# Patient Record
Sex: MALE | Race: WHITE | NOT HISPANIC OR LATINO | Employment: OTHER | URBAN - METROPOLITAN AREA
[De-identification: names, ages, dates, MRNs, and addresses within clinical notes are randomized per-mention and may not be internally consistent; named-entity substitution may affect disease eponyms.]

---

## 2019-11-13 ENCOUNTER — OFFICE VISIT (OUTPATIENT)
Dept: FAMILY MEDICINE | Facility: CLINIC | Age: 79
End: 2019-11-13
Payer: MEDICARE

## 2019-11-13 VITALS
HEIGHT: 67 IN | TEMPERATURE: 98 F | BODY MASS INDEX: 26.64 KG/M2 | HEART RATE: 64 BPM | WEIGHT: 169.75 LBS | DIASTOLIC BLOOD PRESSURE: 66 MMHG | SYSTOLIC BLOOD PRESSURE: 106 MMHG | OXYGEN SATURATION: 91 %

## 2019-11-13 DIAGNOSIS — R97.20 ELEVATED PSA: ICD-10-CM

## 2019-11-13 DIAGNOSIS — I48.0 PAROXYSMAL ATRIAL FIBRILLATION: ICD-10-CM

## 2019-11-13 DIAGNOSIS — F51.01 PRIMARY INSOMNIA: ICD-10-CM

## 2019-11-13 DIAGNOSIS — I10 ESSENTIAL HYPERTENSION: Primary | ICD-10-CM

## 2019-11-13 DIAGNOSIS — J30.9 ALLERGIC RHINITIS, UNSPECIFIED SEASONALITY, UNSPECIFIED TRIGGER: ICD-10-CM

## 2019-11-13 DIAGNOSIS — E78.2 HYPERLIPIDEMIA, MIXED: ICD-10-CM

## 2019-11-13 PROCEDURE — 99999 PR PBB SHADOW E&M-NEW PATIENT-LVL III: CPT | Mod: PBBFAC,HCNC,, | Performed by: INTERNAL MEDICINE

## 2019-11-13 PROCEDURE — 99499 RISK ADDL DX/OHS AUDIT: ICD-10-PCS | Mod: S$GLB,,, | Performed by: INTERNAL MEDICINE

## 2019-11-13 PROCEDURE — 99214 PR OFFICE/OUTPT VISIT, EST, LEVL IV, 30-39 MIN: ICD-10-PCS | Mod: HCNC,S$GLB,, | Performed by: INTERNAL MEDICINE

## 2019-11-13 PROCEDURE — 1101F PR PT FALLS ASSESS DOC 0-1 FALLS W/OUT INJ PAST YR: ICD-10-PCS | Mod: HCNC,CPTII,S$GLB, | Performed by: INTERNAL MEDICINE

## 2019-11-13 PROCEDURE — 1101F PT FALLS ASSESS-DOCD LE1/YR: CPT | Mod: HCNC,CPTII,S$GLB, | Performed by: INTERNAL MEDICINE

## 2019-11-13 PROCEDURE — 99999 PR PBB SHADOW E&M-NEW PATIENT-LVL III: ICD-10-PCS | Mod: PBBFAC,HCNC,, | Performed by: INTERNAL MEDICINE

## 2019-11-13 PROCEDURE — 99499 UNLISTED E&M SERVICE: CPT | Mod: S$GLB,,, | Performed by: INTERNAL MEDICINE

## 2019-11-13 PROCEDURE — 99214 OFFICE O/P EST MOD 30 MIN: CPT | Mod: HCNC,S$GLB,, | Performed by: INTERNAL MEDICINE

## 2019-11-13 RX ORDER — CETIRIZINE HYDROCHLORIDE 10 MG/1
10 TABLET ORAL
COMMUNITY
Start: 2015-05-04

## 2019-11-13 RX ORDER — ASPIRIN 81 MG/1
81 TABLET ORAL
COMMUNITY
Start: 2015-05-04

## 2019-11-13 RX ORDER — ZOLPIDEM TARTRATE 10 MG/1
TABLET ORAL
COMMUNITY
Start: 2019-10-16 | End: 2020-01-09 | Stop reason: SDUPTHER

## 2019-11-13 RX ORDER — DILTIAZEM HYDROCHLORIDE 120 MG/1
CAPSULE, COATED, EXTENDED RELEASE ORAL
COMMUNITY
Start: 2019-10-16 | End: 2020-03-17 | Stop reason: SDUPTHER

## 2019-11-13 RX ORDER — CALCIUM CARB/VITAMIN D3/VIT K1 500-500-40
400 TABLET,CHEWABLE ORAL
COMMUNITY
Start: 2015-05-04

## 2019-11-13 RX ORDER — ATORVASTATIN CALCIUM 20 MG/1
TABLET, FILM COATED ORAL
COMMUNITY
Start: 2019-08-23 | End: 2019-12-05 | Stop reason: SDUPTHER

## 2019-11-13 NOTE — PROGRESS NOTES
[facility]    Chief Complaint      Chief Complaint   Patient presents with    Establish Care       History of Present Illness      Rob Mckeon is a 79 y.o. male with chronic conditions of A fib, HTN, HLD, insomnia,  allergic rhinitis, elevated PSA, vit d deficiency who presents today for: re-establish care from  and review chronic conditions.  A fib: Sees Dr. Pickard.  Appt next month.  On ASA for anticoagulation. Diltiazem for rate control.  Denies chest pain, shortness ofbreath, palpitations.  HTN: BP at goal on diltiazem.  HLD: Controlled on lipitor.  LDL will be done next week at Dr. Serrato.  Insomnia: Contorlled on ambien.  NO new complaints.  Elevated PSA: Due for repeat PSA.  No new or worsening obstructive symptoms.    Allergic rhinitis: Contorlled with mucinex as needed and zyrtec daily.   Flu shot UTD.  Prevnar, pneumovax UTD.    Cscope Dr. Toscano, few years ago, will get record. Hx of polyp, 5 yr interval.    Past Medical History:  Past Medical History:   Diagnosis Date    Pericarditis        Past Surgical History:   has a past surgical history that includes Cholecystectomy and Hernia repair.    Family History:  family history includes No Known Problems in his father and mother.     Social History:  Social History     Tobacco Use    Smoking status: Former Smoker   Substance Use Topics    Alcohol use: Not Currently    Drug use: Not on file       Review of Systems   Constitutional: Negative for chills, fever and malaise/fatigue.   Respiratory: Negative for shortness of breath.    Cardiovascular: Negative for chest pain.   Gastrointestinal: Negative for constipation, diarrhea, nausea and vomiting.   Skin: Negative for rash.   Neurological: Negative for weakness.        Medications:  Outpatient Encounter Medications as of 11/13/2019   Medication Sig Dispense Refill    aspirin (ECOTRIN) 81 MG EC tablet Take 81 mg by mouth.      atorvastatin (LIPITOR) 20 MG tablet        "cetirizine (ZYRTEC) 10 MG tablet Take 10 mg by mouth.      cholecalciferol, vitamin D3, 400 unit Cap Take 400 Int'l Units by mouth.      diltiaZEM (CARDIZEM CD) 120 MG Cp24       zolpidem (AMBIEN) 10 mg Tab        No facility-administered encounter medications on file as of 11/13/2019.        Allergies:  Review of patient's allergies indicates:  No Known Allergies    Health Maintenance:    There is no immunization history on file for this patient.   Health Maintenance   Topic Date Due    Lipid Panel  1940    TETANUS VACCINE  02/15/1956    Pneumococcal Vaccine (65+ Low/Medium Risk) (1 of 2 - PCV13) 02/15/2003        Physical Exam      Vital Signs  Temp: 97.5 °F (36.4 °C)  Temp src: Oral  Pulse: 64  SpO2: (!) 91 %  BP: 106/66  BP Location: Right arm  Patient Position: Sitting  Height and Weight  Height: 5' 7" (170.2 cm)  Weight: 77 kg (169 lb 12.1 oz)  BSA (Calculated - sq m): 1.91 sq meters  BMI (Calculated): 26.6  Weight in (lb) to have BMI = 25: 159.3]    Physical Exam   Constitutional: He appears well-developed and well-nourished.   HENT:   Head: Normocephalic and atraumatic.   Right Ear: External ear normal.   Left Ear: External ear normal.   Mouth/Throat: Oropharynx is clear and moist.   Eyes: Pupils are equal, round, and reactive to light. Conjunctivae and EOM are normal.   Cardiovascular: Normal rate, regular rhythm, normal heart sounds and intact distal pulses.   No murmur heard.  Pulmonary/Chest: Effort normal and breath sounds normal. He has no wheezes. He has no rales.   Abdominal: Soft. Bowel sounds are normal. He exhibits no distension and no abdominal bruit. There is no hepatosplenomegaly. There is no tenderness.   Vitals reviewed.       Laboratory:  CBC:      CMP:        Invalid input(s): CREATININ  URINALYSIS:       LIPIDS:      TSH:      A1C:        Assessment/Plan     Rob Mckeon is a 79 y.o.male with:    1. Essential hypertension  Continue current meds.    2. Hyperlipidemia, " mixed  Continue current meds.  Updating labs with Dr. Pickard.  3. Primary insomnia  Continue current meds.    4. Allergic rhinitis, unspecified seasonality, unspecified trigger  Continue current meds.    5. Elevated PSA  Updating labs.  6. Paroxysmal atrial fibrillation   Continue current meds.  F/U with Dr. Pickard.    Chronic conditions status updated as per HPI.  Other than changes above, cont current medications and maintain follow up with specialists.  Return to clinic in 6 months.    Rc Cabrera MD  [facility]

## 2019-11-21 ENCOUNTER — PATIENT OUTREACH (OUTPATIENT)
Dept: ADMINISTRATIVE | Facility: HOSPITAL | Age: 79
End: 2019-11-21

## 2019-12-05 RX ORDER — ATORVASTATIN CALCIUM 20 MG/1
20 TABLET, FILM COATED ORAL DAILY
Qty: 90 TABLET | Refills: 3 | Status: SHIPPED | OUTPATIENT
Start: 2019-12-05 | End: 2020-09-17

## 2019-12-05 NOTE — TELEPHONE ENCOUNTER
----- Message from Latia Stockton sent at 12/5/2019 11:28 AM CST -----  Contact: self / [phone]  Patient is requesting a refill on the below. Please advise    atorvastatin (LIPITOR) 20 MG tablet    Pharmacy     [facility] [place] [place] [facility] [address] [facility]

## 2019-12-09 LAB
CHOL/HDLC RATIO: 2
CHOLEST SERPL-MSCNC: 100 MG/DL (ref 0–200)
HDLC SERPL-MCNC: 42 MG/DL
LDLC SERPL CALC-MCNC: 41 MG/DL
TRIGLYCERIDE (LIPID PAN): 105

## 2019-12-17 ENCOUNTER — TELEPHONE (OUTPATIENT)
Dept: FAMILY MEDICINE | Facility: CLINIC | Age: 79
End: 2019-12-17

## 2019-12-17 NOTE — TELEPHONE ENCOUNTER
----- Message from Suzanna Soto sent at 12/17/2019  1:01 PM CST -----  Pt can be reached at [phone]    Pt called to get his prostrate test results.    Thank you!

## 2019-12-18 ENCOUNTER — TELEPHONE (OUTPATIENT)
Dept: FAMILY MEDICINE | Facility: CLINIC | Age: 79
End: 2019-12-18

## 2019-12-18 NOTE — TELEPHONE ENCOUNTER
----- Message from Tara Hector sent at 12/18/2019  9:18 AM CST -----  Contact: patient  Type:  Test Results    Who Called: pt  Name of Test (Lab/Mammo/Etc): labs  Date of Test: last week  Ordering Provider: Dr. Cabrera  Where the test was performed: [facility]  Would the patient rather a call back or a response via [facility]? call  Best Call Back Number: [phone]  Additional Information: n/a

## 2020-01-09 ENCOUNTER — TELEPHONE (OUTPATIENT)
Dept: FAMILY MEDICINE | Facility: CLINIC | Age: 80
End: 2020-01-09

## 2020-01-09 DIAGNOSIS — F51.01 PRIMARY INSOMNIA: Primary | ICD-10-CM

## 2020-01-09 RX ORDER — ZOLPIDEM TARTRATE 10 MG/1
10 TABLET ORAL NIGHTLY
Qty: 90 TABLET | Refills: 1 | Status: SHIPPED | OUTPATIENT
Start: 2020-01-09 | End: 2020-12-15 | Stop reason: SDUPTHER

## 2020-01-09 NOTE — TELEPHONE ENCOUNTER
----- Message from Michelle Thao sent at 1/9/2020 10:17 AM CST -----  Contact: Wife [phone]  Patient would like a refill for zolpidem (AMBIEN) 10 mg Tab for 90 day supply sent to [facility] - [facility] [address]. Please advise.

## 2020-03-17 RX ORDER — DILTIAZEM HYDROCHLORIDE 120 MG/1
120 CAPSULE, COATED, EXTENDED RELEASE ORAL DAILY
Qty: 90 CAPSULE | Refills: 3 | Status: SHIPPED | OUTPATIENT
Start: 2020-03-17 | End: 2020-12-23

## 2020-03-17 NOTE — TELEPHONE ENCOUNTER
----- Message from Lilibeth George sent at 3/17/2020  9:45 AM CDT -----  Contact: [phone]-self  Patient is requesting a call back concerning the refill on his medication for diltiaZEM (CARDIZEM CD) 120 MG Cp24. Please call

## 2020-04-28 ENCOUNTER — TELEPHONE (OUTPATIENT)
Dept: FAMILY MEDICINE | Facility: CLINIC | Age: 80
End: 2020-04-28

## 2020-04-28 NOTE — TELEPHONE ENCOUNTER
----- Message from Beatriz Mejia sent at 4/28/2020 11:43 AM CDT -----  Contact: [phone] self   Pt is requesting to speak with you re: lab orders for upcoming appt. Please advise

## 2020-04-29 ENCOUNTER — TELEPHONE (OUTPATIENT)
Dept: ADMINISTRATIVE | Facility: HOSPITAL | Age: 80
End: 2020-04-29

## 2020-04-29 ENCOUNTER — PATIENT OUTREACH (OUTPATIENT)
Dept: ADMINISTRATIVE | Facility: HOSPITAL | Age: 80
End: 2020-04-29

## 2020-04-29 ENCOUNTER — OFFICE VISIT (OUTPATIENT)
Dept: FAMILY MEDICINE | Facility: CLINIC | Age: 80
End: 2020-04-29
Payer: MEDICARE

## 2020-04-29 VITALS
HEART RATE: 74 BPM | TEMPERATURE: 98 F | OXYGEN SATURATION: 90 % | SYSTOLIC BLOOD PRESSURE: 118 MMHG | DIASTOLIC BLOOD PRESSURE: 84 MMHG | HEIGHT: 67 IN | BODY MASS INDEX: 26.28 KG/M2 | WEIGHT: 167.44 LBS

## 2020-04-29 DIAGNOSIS — J30.9 ALLERGIC RHINITIS, UNSPECIFIED SEASONALITY, UNSPECIFIED TRIGGER: ICD-10-CM

## 2020-04-29 DIAGNOSIS — I48.0 PAROXYSMAL ATRIAL FIBRILLATION: ICD-10-CM

## 2020-04-29 DIAGNOSIS — F51.01 PRIMARY INSOMNIA: ICD-10-CM

## 2020-04-29 DIAGNOSIS — E78.2 HYPERLIPIDEMIA, MIXED: ICD-10-CM

## 2020-04-29 DIAGNOSIS — R97.20 ELEVATED PSA: ICD-10-CM

## 2020-04-29 DIAGNOSIS — I10 ESSENTIAL HYPERTENSION: Primary | ICD-10-CM

## 2020-04-29 PROCEDURE — 99999 PR PBB SHADOW E&M-EST. PATIENT-LVL III: CPT | Mod: PBBFAC,HCNC,, | Performed by: INTERNAL MEDICINE

## 2020-04-29 PROCEDURE — 1126F PR PAIN SEVERITY QUANTIFIED, NO PAIN PRESENT: ICD-10-PCS | Mod: HCNC,S$GLB,, | Performed by: INTERNAL MEDICINE

## 2020-04-29 PROCEDURE — 99499 RISK ADDL DX/OHS AUDIT: ICD-10-PCS | Mod: S$GLB,,, | Performed by: INTERNAL MEDICINE

## 2020-04-29 PROCEDURE — 1101F PR PT FALLS ASSESS DOC 0-1 FALLS W/OUT INJ PAST YR: ICD-10-PCS | Mod: HCNC,CPTII,S$GLB, | Performed by: INTERNAL MEDICINE

## 2020-04-29 PROCEDURE — 3079F PR MOST RECENT DIASTOLIC BLOOD PRESSURE 80-89 MM HG: ICD-10-PCS | Mod: HCNC,CPTII,S$GLB, | Performed by: INTERNAL MEDICINE

## 2020-04-29 PROCEDURE — 3074F PR MOST RECENT SYSTOLIC BLOOD PRESSURE < 130 MM HG: ICD-10-PCS | Mod: HCNC,CPTII,S$GLB, | Performed by: INTERNAL MEDICINE

## 2020-04-29 PROCEDURE — 99214 PR OFFICE/OUTPT VISIT, EST, LEVL IV, 30-39 MIN: ICD-10-PCS | Mod: HCNC,S$GLB,, | Performed by: INTERNAL MEDICINE

## 2020-04-29 PROCEDURE — 1159F MED LIST DOCD IN RCRD: CPT | Mod: HCNC,S$GLB,, | Performed by: INTERNAL MEDICINE

## 2020-04-29 PROCEDURE — 3074F SYST BP LT 130 MM HG: CPT | Mod: HCNC,CPTII,S$GLB, | Performed by: INTERNAL MEDICINE

## 2020-04-29 PROCEDURE — 99499 UNLISTED E&M SERVICE: CPT | Mod: S$GLB,,, | Performed by: INTERNAL MEDICINE

## 2020-04-29 PROCEDURE — 99999 PR PBB SHADOW E&M-EST. PATIENT-LVL III: ICD-10-PCS | Mod: PBBFAC,HCNC,, | Performed by: INTERNAL MEDICINE

## 2020-04-29 PROCEDURE — 3079F DIAST BP 80-89 MM HG: CPT | Mod: HCNC,CPTII,S$GLB, | Performed by: INTERNAL MEDICINE

## 2020-04-29 PROCEDURE — 99214 OFFICE O/P EST MOD 30 MIN: CPT | Mod: HCNC,S$GLB,, | Performed by: INTERNAL MEDICINE

## 2020-04-29 PROCEDURE — 1101F PT FALLS ASSESS-DOCD LE1/YR: CPT | Mod: HCNC,CPTII,S$GLB, | Performed by: INTERNAL MEDICINE

## 2020-04-29 PROCEDURE — 1126F AMNT PAIN NOTED NONE PRSNT: CPT | Mod: HCNC,S$GLB,, | Performed by: INTERNAL MEDICINE

## 2020-04-29 PROCEDURE — 1159F PR MEDICATION LIST DOCUMENTED IN MEDICAL RECORD: ICD-10-PCS | Mod: HCNC,S$GLB,, | Performed by: INTERNAL MEDICINE

## 2020-04-29 RX ORDER — CALCIUM CARB/VITAMIN D3/VIT K1 500-500-40
TABLET,CHEWABLE ORAL
COMMUNITY
Start: 2015-05-04

## 2020-04-29 NOTE — PROGRESS NOTES
[facility] Note    Chief Complaint      Chief Complaint   Patient presents with    Hypertension     6 MONTH       History of Present Illness      Rob Mckeon is a 80 y.o. male with chronic conditions of HTN, HLD, A fib, insomnia, allergic rhinitis, elevated PSA, vit d deficiency who presents today for: follow up chronic conditions.  A fib: Sees Dr. Pickard.  On ASA for anticoagulation. Diltiazem for rate control.  Denies chest pain, shortness ofbreath, palpitations.  HTN: BP at goal on diltiazem.  HLD: Controlled on lipitor.  LDL 41 12/2019 with Dr. Pickard.  Insomnia: Contorlled on ambien.  NO new complaints.  Elevated PSA: Due for repeat PSA.  Last check 5.1.  No new or worsening obstructive symptoms.    Allergic rhinitis: Contorlled with mucinex as needed and zyrtec daily.   Flu shot UTD.  Prevnar, pneumovax UTD.    Cscope Dr. Toscano, unsure when, will get record. Hx of polyp, 5 yr interval.    Past Medical History:  Past Medical History:   Diagnosis Date    Pericarditis        Past Surgical History:   has a past surgical history that includes Cholecystectomy and Hernia repair.    Family History:  family history includes No Known Problems in his father and mother.     Social History:  Social History     Tobacco Use    Smoking status: Former Smoker   Substance Use Topics    Alcohol use: Not Currently    Drug use: Not on file       Review of Systems   Constitutional: Negative for chills, fever and malaise/fatigue.   Respiratory: Negative for shortness of breath.    Cardiovascular: Negative for chest pain.   Gastrointestinal: Negative for constipation, diarrhea, nausea and vomiting.   Skin: Negative for rash.   Neurological: Negative for weakness.        Medications:  Outpatient Encounter Medications as of 4/29/2020   Medication Sig Dispense Refill    aspirin (ECOTRIN) 81 MG EC tablet Take 81 mg by mouth.      atorvastatin (LIPITOR) 20 MG tablet Take 1 tablet (20 mg total) by mouth once  "daily. 90 tablet 3    cetirizine (ZYRTEC) 10 MG tablet Take 10 mg by mouth.      cholecalciferol, vitamin D3, 10 mcg (400 unit) Cap Take by mouth.      cholecalciferol, vitamin D3, 400 unit Cap Take 400 Int'l Units by mouth.      diltiaZEM (CARDIZEM CD) 120 MG Cp24 Take 1 capsule (120 mg total) by mouth once daily. 90 capsule 3    zolpidem (AMBIEN) 10 mg Tab Take 1 tablet (10 mg total) by mouth every evening. 90 tablet 1     No facility-administered encounter medications on file as of 4/29/2020.        Allergies:  Review of patient's allergies indicates:  No Known Allergies    Health Maintenance:  Immunization History   Administered Date(s) Administered    Influenza 11/14/2005, 09/16/2009, 09/28/2010, 09/26/2017, 09/14/2018    Pneumococcal Conjugate - 13 Valent 05/30/2015    Pneumococcal Polysaccharide - 23 Valent 09/14/2018    Zoster 10/02/2014    Zoster Recombinant 03/11/2019      Health Maintenance   Topic Date Due    Lipid Panel  1940    TETANUS VACCINE  02/15/1956    Pneumococcal Vaccine (65+ Low/Medium Risk)  Completed        Physical Exam      Vital Signs  Temp: 97.6 °F (36.4 °C)  Temp src: Oral  Pulse: 74  SpO2: (!) 90 %  BP: 118/84  BP Location: Left arm  Patient Position: Sitting  Pain Score: 0-No pain  Height and Weight  Height: 5' 7" (170.2 cm)  Weight: 76 kg (167 lb 7 oz)  BSA (Calculated - sq m): 1.89 sq meters  BMI (Calculated): 26.2  Weight in (lb) to have BMI = 25: 159.3]    Physical Exam   Constitutional: He appears well-developed and well-nourished.   HENT:   Head: Normocephalic and atraumatic.   Right Ear: External ear normal.   Left Ear: External ear normal.   Mouth/Throat: Oropharynx is clear and moist.   Eyes: Pupils are equal, round, and reactive to light. Conjunctivae and EOM are normal.   Cardiovascular: Normal rate and intact distal pulses. An irregularly irregular rhythm present.   Murmur heard.   Systolic murmur is present with a grade of 3/6.  Pulmonary/Chest: Effort " normal and breath sounds normal. He has no wheezes. He has no rales.   Abdominal: Soft. Bowel sounds are normal. He exhibits no distension and no abdominal bruit. There is no hepatosplenomegaly. There is no tenderness.   Vitals reviewed.       Laboratory:  CBC:      CMP:        Invalid input(s): CREATININ  URINALYSIS:       LIPIDS:      TSH:      A1C:        Assessment/Plan     Rob Mckeon is a 80 y.o.male with:    1. Essential hypertension  - Comprehensive metabolic panel; Future  Continue current meds.    2. Paroxysmal atrial fibrillation  - Comprehensive metabolic panel; Future  - CBC auto differential; Future  Continue current meds.  F/U with Dr. Pickard as scheduled.  3. Hyperlipidemia, mixed  - Comprehensive metabolic panel; Future  Continue current meds.  Reviewed last labs.  4. Elevated PSA  - CBC auto differential; Future  - PSA, total and free; Future  Update labs.  No new obstructive symptoms  5. Primary insomnia  Continue current meds.    6. Allergic rhinitis, unspecified seasonality, unspecified trigger   Continue current meds.      Chronic conditions status updated as per HPI.  Other than changes above, cont current medications and maintain follow up with specialists.  Return to clinic in 6 months.    Rc Cabrera MD  [facility]

## 2020-11-23 DIAGNOSIS — E78.2 HYPERLIPIDEMIA, MIXED: ICD-10-CM

## 2020-11-23 RX ORDER — ATORVASTATIN CALCIUM 20 MG/1
TABLET, FILM COATED ORAL
Qty: 90 TABLET | Refills: 3 | Status: SHIPPED | OUTPATIENT
Start: 2020-11-23 | End: 2021-08-26

## 2020-11-23 NOTE — TELEPHONE ENCOUNTER
Care Due:                  Date            Visit Type   Department     Provider  --------------------------------------------------------------------------------                                ESTABLISHED   DESC FAMILY  Last Visit: 04-      PATIENT      [facility]  Rc Dawson                              ESTABLISHED                              PATIENT      DESC FAMILY  Next Visit: 12-      [facility]  Rc Dawson                                                            Last  Test          Frequency    Reason                     Performed    Due Date  --------------------------------------------------------------------------------    Lipid Panel.  12 months..  atorvastatin.............  12- 11-    Powered by [employer]. Reference number: [id number]. 11/23/2020 2:50:07 PM   CST

## 2020-12-15 ENCOUNTER — OFFICE VISIT (OUTPATIENT)
Dept: FAMILY MEDICINE | Facility: CLINIC | Age: 80
End: 2020-12-15
Payer: MEDICARE

## 2020-12-15 VITALS
SYSTOLIC BLOOD PRESSURE: 120 MMHG | TEMPERATURE: 98 F | OXYGEN SATURATION: 90 % | HEIGHT: 67 IN | HEART RATE: 58 BPM | WEIGHT: 168.19 LBS | DIASTOLIC BLOOD PRESSURE: 68 MMHG | BODY MASS INDEX: 26.4 KG/M2

## 2020-12-15 DIAGNOSIS — I10 ESSENTIAL HYPERTENSION: Primary | ICD-10-CM

## 2020-12-15 DIAGNOSIS — J30.9 ALLERGIC RHINITIS, UNSPECIFIED SEASONALITY, UNSPECIFIED TRIGGER: ICD-10-CM

## 2020-12-15 DIAGNOSIS — I48.0 PAROXYSMAL ATRIAL FIBRILLATION: ICD-10-CM

## 2020-12-15 DIAGNOSIS — R97.20 ELEVATED PSA: ICD-10-CM

## 2020-12-15 DIAGNOSIS — F51.01 PRIMARY INSOMNIA: ICD-10-CM

## 2020-12-15 DIAGNOSIS — E78.2 HYPERLIPIDEMIA, MIXED: ICD-10-CM

## 2020-12-15 PROCEDURE — 99214 OFFICE O/P EST MOD 30 MIN: CPT | Mod: HCNC,S$GLB,, | Performed by: INTERNAL MEDICINE

## 2020-12-15 PROCEDURE — 1159F MED LIST DOCD IN RCRD: CPT | Mod: HCNC,S$GLB,, | Performed by: INTERNAL MEDICINE

## 2020-12-15 PROCEDURE — 3288F FALL RISK ASSESSMENT DOCD: CPT | Mod: HCNC,CPTII,S$GLB, | Performed by: INTERNAL MEDICINE

## 2020-12-15 PROCEDURE — 1126F AMNT PAIN NOTED NONE PRSNT: CPT | Mod: HCNC,S$GLB,, | Performed by: INTERNAL MEDICINE

## 2020-12-15 PROCEDURE — 1126F PR PAIN SEVERITY QUANTIFIED, NO PAIN PRESENT: ICD-10-PCS | Mod: HCNC,S$GLB,, | Performed by: INTERNAL MEDICINE

## 2020-12-15 PROCEDURE — 1101F PR PT FALLS ASSESS DOC 0-1 FALLS W/OUT INJ PAST YR: ICD-10-PCS | Mod: HCNC,CPTII,S$GLB, | Performed by: INTERNAL MEDICINE

## 2020-12-15 PROCEDURE — 1101F PT FALLS ASSESS-DOCD LE1/YR: CPT | Mod: HCNC,CPTII,S$GLB, | Performed by: INTERNAL MEDICINE

## 2020-12-15 PROCEDURE — 3078F PR MOST RECENT DIASTOLIC BLOOD PRESSURE < 80 MM HG: ICD-10-PCS | Mod: HCNC,CPTII,S$GLB, | Performed by: INTERNAL MEDICINE

## 2020-12-15 PROCEDURE — 3074F PR MOST RECENT SYSTOLIC BLOOD PRESSURE < 130 MM HG: ICD-10-PCS | Mod: HCNC,CPTII,S$GLB, | Performed by: INTERNAL MEDICINE

## 2020-12-15 PROCEDURE — 99999 PR PBB SHADOW E&M-EST. PATIENT-LVL III: ICD-10-PCS | Mod: PBBFAC,HCNC,, | Performed by: INTERNAL MEDICINE

## 2020-12-15 PROCEDURE — 3288F PR FALLS RISK ASSESSMENT DOCUMENTED: ICD-10-PCS | Mod: HCNC,CPTII,S$GLB, | Performed by: INTERNAL MEDICINE

## 2020-12-15 PROCEDURE — 3078F DIAST BP <80 MM HG: CPT | Mod: HCNC,CPTII,S$GLB, | Performed by: INTERNAL MEDICINE

## 2020-12-15 PROCEDURE — 99214 PR OFFICE/OUTPT VISIT, EST, LEVL IV, 30-39 MIN: ICD-10-PCS | Mod: HCNC,S$GLB,, | Performed by: INTERNAL MEDICINE

## 2020-12-15 PROCEDURE — 99999 PR PBB SHADOW E&M-EST. PATIENT-LVL III: CPT | Mod: PBBFAC,HCNC,, | Performed by: INTERNAL MEDICINE

## 2020-12-15 PROCEDURE — 3074F SYST BP LT 130 MM HG: CPT | Mod: HCNC,CPTII,S$GLB, | Performed by: INTERNAL MEDICINE

## 2020-12-15 PROCEDURE — 1159F PR MEDICATION LIST DOCUMENTED IN MEDICAL RECORD: ICD-10-PCS | Mod: HCNC,S$GLB,, | Performed by: INTERNAL MEDICINE

## 2020-12-15 RX ORDER — ZOLPIDEM TARTRATE 10 MG/1
10 TABLET ORAL NIGHTLY
Qty: 90 TABLET | Refills: 1 | Status: SHIPPED | OUTPATIENT
Start: 2020-12-15 | End: 2021-09-28 | Stop reason: SDUPTHER

## 2020-12-21 ENCOUNTER — TELEPHONE (OUTPATIENT)
Dept: FAMILY MEDICINE | Facility: CLINIC | Age: 80
End: 2020-12-21

## 2020-12-21 NOTE — TELEPHONE ENCOUNTER
Spoke with pt and informed him that we mailed his 12/15/2020 AVS to him. Pt verbalized understanding.

## 2020-12-21 NOTE — TELEPHONE ENCOUNTER
----- Message from Lacy Treviño sent at 12/21/2020  1:43 PM CST -----  Contact: pt, [phone] (H)  Patient requests his 12/15 appointment summary mailed to his home please. States he forgot it.

## 2021-02-10 ENCOUNTER — IMMUNIZATION (OUTPATIENT)
Dept: PRIMARY CARE CLINIC | Facility: CLINIC | Age: 81
End: 2021-02-10
Payer: MEDICARE

## 2021-02-10 DIAGNOSIS — Z23 NEED FOR VACCINATION: Primary | ICD-10-CM

## 2021-02-10 PROCEDURE — 91300 PR SARS-COV- 2 COVID-19 VACCINE, NO PRSV, 30MCG/0.3ML, IM: CPT | Mod: PBBFAC | Performed by: INTERNAL MEDICINE

## 2021-02-10 PROCEDURE — 0001A PR IMMUNIZ ADMIN, SARS-COV-2 COVID-19 VACC, 30MCG/0.3ML, 1ST DOSE: CPT | Mod: PBBFAC | Performed by: INTERNAL MEDICINE

## 2021-02-10 RX ADMIN — RNA INGREDIENT BNT-162B2 0.3 ML: 0.23 INJECTION, SUSPENSION INTRAMUSCULAR at 12:02

## 2021-03-03 ENCOUNTER — IMMUNIZATION (OUTPATIENT)
Dept: PRIMARY CARE CLINIC | Facility: CLINIC | Age: 81
End: 2021-03-03
Payer: MEDICARE

## 2021-03-03 DIAGNOSIS — Z23 NEED FOR VACCINATION: Primary | ICD-10-CM

## 2021-03-03 PROCEDURE — 91300 PR SARS-COV- 2 COVID-19 VACCINE, NO PRSV, 30MCG/0.3ML, IM: CPT | Mod: S$GLB,,, | Performed by: INTERNAL MEDICINE

## 2021-03-03 PROCEDURE — 0002A PR IMMUNIZ ADMIN, SARS-COV-2 COVID-19 VACC, 30MCG/0.3ML, 2ND DOSE: CPT | Mod: CV19,S$GLB,, | Performed by: INTERNAL MEDICINE

## 2021-03-03 PROCEDURE — 0002A PR IMMUNIZ ADMIN, SARS-COV-2 COVID-19 VACC, 30MCG/0.3ML, 2ND DOSE: ICD-10-PCS | Mod: CV19,S$GLB,, | Performed by: INTERNAL MEDICINE

## 2021-03-03 PROCEDURE — 91300 PR SARS-COV- 2 COVID-19 VACCINE, NO PRSV, 30MCG/0.3ML, IM: ICD-10-PCS | Mod: S$GLB,,, | Performed by: INTERNAL MEDICINE

## 2021-03-03 RX ADMIN — Medication 0.3 ML: at 01:03

## 2021-05-06 ENCOUNTER — PES CALL (OUTPATIENT)
Dept: ADMINISTRATIVE | Facility: CLINIC | Age: 81
End: 2021-05-06

## 2021-06-23 ENCOUNTER — OFFICE VISIT (OUTPATIENT)
Dept: FAMILY MEDICINE | Facility: CLINIC | Age: 81
End: 2021-06-23
Payer: MEDICARE

## 2021-06-23 VITALS
WEIGHT: 167.13 LBS | HEIGHT: 67 IN | DIASTOLIC BLOOD PRESSURE: 72 MMHG | SYSTOLIC BLOOD PRESSURE: 138 MMHG | OXYGEN SATURATION: 93 % | BODY MASS INDEX: 26.23 KG/M2 | TEMPERATURE: 98 F | HEART RATE: 59 BPM

## 2021-06-23 DIAGNOSIS — R97.20 ELEVATED PSA: ICD-10-CM

## 2021-06-23 DIAGNOSIS — F51.01 PRIMARY INSOMNIA: ICD-10-CM

## 2021-06-23 DIAGNOSIS — J30.9 ALLERGIC RHINITIS, UNSPECIFIED SEASONALITY, UNSPECIFIED TRIGGER: ICD-10-CM

## 2021-06-23 DIAGNOSIS — I10 ESSENTIAL HYPERTENSION: Primary | ICD-10-CM

## 2021-06-23 DIAGNOSIS — E78.2 HYPERLIPIDEMIA, MIXED: ICD-10-CM

## 2021-06-23 DIAGNOSIS — I48.0 PAROXYSMAL ATRIAL FIBRILLATION: ICD-10-CM

## 2021-06-23 PROCEDURE — 1159F PR MEDICATION LIST DOCUMENTED IN MEDICAL RECORD: ICD-10-PCS | Mod: S$GLB,,, | Performed by: INTERNAL MEDICINE

## 2021-06-23 PROCEDURE — 99214 OFFICE O/P EST MOD 30 MIN: CPT | Mod: S$GLB,,, | Performed by: INTERNAL MEDICINE

## 2021-06-23 PROCEDURE — 1159F MED LIST DOCD IN RCRD: CPT | Mod: S$GLB,,, | Performed by: INTERNAL MEDICINE

## 2021-06-23 PROCEDURE — 99499 RISK ADDL DX/OHS AUDIT: ICD-10-PCS | Mod: S$GLB,,, | Performed by: INTERNAL MEDICINE

## 2021-06-23 PROCEDURE — 3075F SYST BP GE 130 - 139MM HG: CPT | Mod: CPTII,S$GLB,, | Performed by: INTERNAL MEDICINE

## 2021-06-23 PROCEDURE — 99999 PR PBB SHADOW E&M-EST. PATIENT-LVL III: CPT | Mod: PBBFAC,,, | Performed by: INTERNAL MEDICINE

## 2021-06-23 PROCEDURE — 1126F AMNT PAIN NOTED NONE PRSNT: CPT | Mod: S$GLB,,, | Performed by: INTERNAL MEDICINE

## 2021-06-23 PROCEDURE — 3078F DIAST BP <80 MM HG: CPT | Mod: CPTII,S$GLB,, | Performed by: INTERNAL MEDICINE

## 2021-06-23 PROCEDURE — 1126F PR PAIN SEVERITY QUANTIFIED, NO PAIN PRESENT: ICD-10-PCS | Mod: S$GLB,,, | Performed by: INTERNAL MEDICINE

## 2021-06-23 PROCEDURE — 99214 PR OFFICE/OUTPT VISIT, EST, LEVL IV, 30-39 MIN: ICD-10-PCS | Mod: S$GLB,,, | Performed by: INTERNAL MEDICINE

## 2021-06-23 PROCEDURE — 3078F PR MOST RECENT DIASTOLIC BLOOD PRESSURE < 80 MM HG: ICD-10-PCS | Mod: CPTII,S$GLB,, | Performed by: INTERNAL MEDICINE

## 2021-06-23 PROCEDURE — 99499 UNLISTED E&M SERVICE: CPT | Mod: S$GLB,,, | Performed by: INTERNAL MEDICINE

## 2021-06-23 PROCEDURE — 3075F PR MOST RECENT SYSTOLIC BLOOD PRESS GE 130-139MM HG: ICD-10-PCS | Mod: CPTII,S$GLB,, | Performed by: INTERNAL MEDICINE

## 2021-06-23 PROCEDURE — 99999 PR PBB SHADOW E&M-EST. PATIENT-LVL III: ICD-10-PCS | Mod: PBBFAC,,, | Performed by: INTERNAL MEDICINE

## 2021-07-02 ENCOUNTER — PES CALL (OUTPATIENT)
Dept: ADMINISTRATIVE | Facility: CLINIC | Age: 81
End: 2021-07-02

## 2021-08-25 DIAGNOSIS — E78.2 HYPERLIPIDEMIA, MIXED: ICD-10-CM

## 2021-08-26 RX ORDER — ATORVASTATIN CALCIUM 20 MG/1
20 TABLET, FILM COATED ORAL DAILY
Qty: 90 TABLET | Refills: 3 | Status: SHIPPED | OUTPATIENT
Start: 2021-08-26 | End: 2022-06-22

## 2021-10-16 ENCOUNTER — NURSE TRIAGE (OUTPATIENT)
Dept: ADMINISTRATIVE | Facility: CLINIC | Age: 81
End: 2021-10-16

## 2021-10-18 ENCOUNTER — TELEPHONE (OUTPATIENT)
Dept: PRIMARY CARE CLINIC | Facility: CLINIC | Age: 81
End: 2021-10-18

## 2022-01-11 ENCOUNTER — OFFICE VISIT (OUTPATIENT)
Dept: INTERNAL MEDICINE | Facility: CLINIC | Age: 82
End: 2022-01-11
Payer: MEDICARE

## 2022-01-11 VITALS
SYSTOLIC BLOOD PRESSURE: 130 MMHG | WEIGHT: 162.56 LBS | BODY MASS INDEX: 25.47 KG/M2 | TEMPERATURE: 99 F | OXYGEN SATURATION: 90 % | DIASTOLIC BLOOD PRESSURE: 80 MMHG | HEART RATE: 85 BPM

## 2022-01-11 DIAGNOSIS — I48.0 PAROXYSMAL ATRIAL FIBRILLATION: ICD-10-CM

## 2022-01-11 DIAGNOSIS — I10 ESSENTIAL HYPERTENSION: Primary | ICD-10-CM

## 2022-01-11 DIAGNOSIS — R97.20 ELEVATED PSA: ICD-10-CM

## 2022-01-11 DIAGNOSIS — E78.2 HYPERLIPIDEMIA, MIXED: ICD-10-CM

## 2022-01-11 DIAGNOSIS — J30.9 ALLERGIC RHINITIS, UNSPECIFIED SEASONALITY, UNSPECIFIED TRIGGER: ICD-10-CM

## 2022-01-11 DIAGNOSIS — F51.01 PRIMARY INSOMNIA: ICD-10-CM

## 2022-01-11 PROCEDURE — 99499 RISK ADDL DX/OHS AUDIT: ICD-10-PCS | Mod: S$GLB,,, | Performed by: INTERNAL MEDICINE

## 2022-01-11 PROCEDURE — 99214 PR OFFICE/OUTPT VISIT, EST, LEVL IV, 30-39 MIN: ICD-10-PCS | Mod: HCNC,S$GLB,, | Performed by: INTERNAL MEDICINE

## 2022-01-11 PROCEDURE — 1101F PT FALLS ASSESS-DOCD LE1/YR: CPT | Mod: HCNC,CPTII,S$GLB, | Performed by: INTERNAL MEDICINE

## 2022-01-11 PROCEDURE — 3075F SYST BP GE 130 - 139MM HG: CPT | Mod: HCNC,CPTII,S$GLB, | Performed by: INTERNAL MEDICINE

## 2022-01-11 PROCEDURE — 1126F AMNT PAIN NOTED NONE PRSNT: CPT | Mod: HCNC,CPTII,S$GLB, | Performed by: INTERNAL MEDICINE

## 2022-01-11 PROCEDURE — 1159F PR MEDICATION LIST DOCUMENTED IN MEDICAL RECORD: ICD-10-PCS | Mod: HCNC,CPTII,S$GLB, | Performed by: INTERNAL MEDICINE

## 2022-01-11 PROCEDURE — 3288F PR FALLS RISK ASSESSMENT DOCUMENTED: ICD-10-PCS | Mod: HCNC,CPTII,S$GLB, | Performed by: INTERNAL MEDICINE

## 2022-01-11 PROCEDURE — 1126F PR PAIN SEVERITY QUANTIFIED, NO PAIN PRESENT: ICD-10-PCS | Mod: HCNC,CPTII,S$GLB, | Performed by: INTERNAL MEDICINE

## 2022-01-11 PROCEDURE — 99499 UNLISTED E&M SERVICE: CPT | Mod: S$GLB,,, | Performed by: INTERNAL MEDICINE

## 2022-01-11 PROCEDURE — 99999 PR PBB SHADOW E&M-EST. PATIENT-LVL III: ICD-10-PCS | Mod: PBBFAC,HCNC,, | Performed by: INTERNAL MEDICINE

## 2022-01-11 PROCEDURE — 3288F FALL RISK ASSESSMENT DOCD: CPT | Mod: HCNC,CPTII,S$GLB, | Performed by: INTERNAL MEDICINE

## 2022-01-11 PROCEDURE — 99214 OFFICE O/P EST MOD 30 MIN: CPT | Mod: HCNC,S$GLB,, | Performed by: INTERNAL MEDICINE

## 2022-01-11 PROCEDURE — 1159F MED LIST DOCD IN RCRD: CPT | Mod: HCNC,CPTII,S$GLB, | Performed by: INTERNAL MEDICINE

## 2022-01-11 PROCEDURE — 3079F PR MOST RECENT DIASTOLIC BLOOD PRESSURE 80-89 MM HG: ICD-10-PCS | Mod: HCNC,CPTII,S$GLB, | Performed by: INTERNAL MEDICINE

## 2022-01-11 PROCEDURE — 3075F PR MOST RECENT SYSTOLIC BLOOD PRESS GE 130-139MM HG: ICD-10-PCS | Mod: HCNC,CPTII,S$GLB, | Performed by: INTERNAL MEDICINE

## 2022-01-11 PROCEDURE — 99999 PR PBB SHADOW E&M-EST. PATIENT-LVL III: CPT | Mod: PBBFAC,HCNC,, | Performed by: INTERNAL MEDICINE

## 2022-01-11 PROCEDURE — 1101F PR PT FALLS ASSESS DOC 0-1 FALLS W/OUT INJ PAST YR: ICD-10-PCS | Mod: HCNC,CPTII,S$GLB, | Performed by: INTERNAL MEDICINE

## 2022-01-11 PROCEDURE — 3079F DIAST BP 80-89 MM HG: CPT | Mod: HCNC,CPTII,S$GLB, | Performed by: INTERNAL MEDICINE

## 2022-01-11 NOTE — PROGRESS NOTES
[facility] Note    Chief Complaint      Chief Complaint   Patient presents with    Follow-up       History of Present Illness      Rob Mckeon is a 81 y.o. male with chronic conditions of A fib, HTN, HLD, elevated PSA, insomnia, allergic rhinitis who presents today for:  Follow-up chronic conditions.  In the past month, has been following with Dr. Carrasco, ENT, for sinusitis.  Has had 2 rounds of oral steroid and antibiotic course.  Also had a steroid shot.  Symptomically feels slightly better.  A fib: Sees Dr. Cavazos.  On ASA for anticoagulation. Diltiazem for rate control.  Denies chest pain, shortness ofbreath, palpitations.  HTN: BP at goal on diltiazem.  HLD: Controlled on lipitor.  LDL 41.  Insomnia: Contorlled on ambien.  No new complaints.  Elevated PSA: PSA 4.6 last check.  No new or worsening obstructive symptoms.    Allergic rhinitis: Controlled with mucinex as needed and zyrtec daily.   Flu shot UTD.  Prevnar, pneumovax UTD.  COVID vaccine UTD.  Shingrix vaccine UTD.  Cscope Dr. Toscano, unsure when, will get record. Hx of polyp, 5 yr interval.    Past Medical History:  Past Medical History:   Diagnosis Date    Pericarditis        Past Surgical History:   has a past surgical history that includes Cholecystectomy and Hernia repair.    Family History:  family history includes No Known Problems in his father and mother.     Social History:  Social History     Tobacco Use    Smoking status: Former Smoker   Substance Use Topics    Alcohol use: Not Currently       I personally reviewed all past medical, surgical, social and family history.    Review of Systems   Constitutional: Negative for chills, fever and malaise/fatigue.   Respiratory: Negative for shortness of breath.    Cardiovascular: Negative for chest pain.   Gastrointestinal: Negative for constipation, diarrhea, nausea and vomiting.   Skin: Negative for rash.   Neurological: Negative for weakness.   All other systems reviewed  and are negative.       Medications:  Outpatient Encounter Medications as of 1/11/2022   Medication Sig Dispense Refill    aspirin (ECOTRIN) 81 MG EC tablet Take 81 mg by mouth.      atorvastatin (LIPITOR) 20 MG tablet Take 1 tablet (20 mg total) by mouth once daily. 90 tablet 3    cetirizine (ZYRTEC) 10 MG tablet Take 10 mg by mouth.      cholecalciferol, vitamin D3, 10 mcg (400 unit) Cap Take by mouth.      cholecalciferol, vitamin D3, 400 unit Cap Take 400 Int'l Units by mouth.      diltiaZEM (CARDIZEM CD) 120 MG Cp24 TAKE 1 CAPSULE EVERY DAY 90 capsule 3    zolpidem (AMBIEN) 10 mg Tab Take 1 tablet (10 mg total) by mouth every evening. 90 tablet 1     No facility-administered encounter medications on file as of 1/11/2022.       Allergies:  Review of patient's allergies indicates:  No Known Allergies    Health Maintenance:  Immunization History   Administered Date(s) Administered    COVID-19, MRNA, LN-S, PF (Pfizer) 02/10/2021, 03/03/2021, 10/04/2021    Influenza 11/14/2005, 09/16/2009, 09/28/2010, 09/26/2017, 09/14/2018    Influenza (FLUAD) - Quadrivalent - Adjuvanted - PF *Preferred* (65+) 10/02/2020    Influenza (FLUAD) - Trivalent - Adjuvanted - PF (65+) 09/26/2017, 09/14/2018, 10/08/2019    Influenza - Quadrivalent - High Dose - PF (65 years and older) 11/09/2021    Influenza - Quadrivalent - PF *Preferred* (6 months and older) 11/14/2005, 09/16/2009, 09/28/2010    Pneumococcal Conjugate - 13 Valent 05/30/2015    Pneumococcal Polysaccharide - 23 Valent 09/14/2018    Zoster 10/02/2014    Zoster Recombinant 11/07/2018, 03/11/2019      Health Maintenance   Topic Date Due    TETANUS VACCINE  Never done    Lipid Panel  06/07/2026        Physical Exam      Vital Signs  Temp: 98.7 °F (37.1 °C)  Pulse: 85  SpO2: (!) 90 %  BP: 130/80  BP Location: Left arm  Patient Position: Sitting  Pain Score: 0-No pain  Height and Weight  Weight: 73.8 kg (162 lb 9.4 oz)]    Physical Exam  Vitals reviewed.    Constitutional:       Appearance: He is well-developed and well-nourished.   HENT:      Head: Normocephalic and atraumatic.      Right Ear: External ear normal.      Left Ear: External ear normal.      Mouth/Throat:      Mouth: Oropharynx is clear and moist.   Cardiovascular:      Rate and Rhythm: Normal rate and regular rhythm.      Heart sounds: Normal heart sounds. No murmur heard.      Pulmonary:      Effort: Pulmonary effort is normal.      Breath sounds: Normal breath sounds. No wheezing or rales.   Abdominal:      General: Bowel sounds are normal.      Palpations: Abdomen is soft.          Laboratory:  CBC:  Recent Labs   Lab 04/29/20  1140 04/29/20  1140 06/07/21  0958   WBC 7.26   < > 7.66   RBC 5.93   < > 5.73   Hemoglobin 17.7   < > 17.1   Hematocrit 55.2 H   < > 53.1   Platelets 264   < > 214   MCV 93   < > 93   MCH 29.8   < > 29.8   MCHC 32.1  --  32.2    < > = values in this interval not displayed.     CMP:  Recent Labs   Lab 04/29/20  1140 04/29/20  1140 06/07/21  0958   Glucose 82   < > 103   Calcium 10.1   < > 9.8   Albumin 4.5   < > 4.3   Total Protein 8.0   < > 7.6   Sodium 142   < > 142   Potassium 6.2 H   < > 5.8 H   CO2 33 H   < > 34 H   Chloride 102   < > 102   BUN 16   < > 21 H   Alkaline Phosphatase 132 H   < > 123   ALT 29   < > 22   AST 35   < > 34   Total Bilirubin 0.8  --  0.9    < > = values in this interval not displayed.     URINALYSIS:       LIPIDS:  Recent Labs   Lab 12/09/19  0000 06/07/21  0954   HDL 42 38 L   Cholesterol 100 101 L   Triglycerides  --  106   LDL Cholesterol 41 41.8 L   HDL/Cholesterol Ratio  --  37.6   Non-HDL Cholesterol  --  63   Total Cholesterol/HDL Ratio  --  2.7     TSH:      A1C:        Assessment/Plan     Rob Mckeon is a 81 y.o.male with:    1. Essential hypertension  Continue current meds.    2. Hyperlipidemia, mixed  Continue current meds.  Update labs next visit  3. Paroxysmal atrial fibrillation  Continue current meds.  F/U with cardiology  4.  Elevated PSA  Update labs next visit.    5. Primary insomnia  Continue current meds.    6. Allergic rhinitis, unspecified seasonality, unspecified trigger  Continue current meds.      Chronic conditions status updated as per HPI.  Other than changes above, cont current medications and maintain follow up with specialists.  Follow up in about 6 months (around 7/11/2022) for Follow up visit.    Future Appointments   Date Time Provider Department Center   7/13/2022  2:00 PM Rc Cabrera MD [facility]       Rc Cabrera MD  [facility]

## 2022-04-14 ENCOUNTER — TELEPHONE (OUTPATIENT)
Dept: INTERNAL MEDICINE | Facility: CLINIC | Age: 82
End: 2022-04-14
Payer: MEDICARE

## 2022-04-14 NOTE — TELEPHONE ENCOUNTER
----- Message from Sg Barger sent at 4/14/2022 11:09 AM CDT -----  Contact: pt.  .Type:  Sooner Apoointment Request    Caller is requesting a sooner appointment.  Caller declined first available appointment listed below.  Caller will not accept being placed on the waitlist and is requesting a message be sent to doctor.  Name of Caller:pt. wife  When is the first available appointment?May 2022  Symptoms:numbness in finger and low oxygen reading  Would the patient rather a call back or a response via [facility]? Call back  Best Call Back Number:[phone]  Additional Information: Pt. Needs to be seen soon because of emergency visit on yesterday was told to f/u with PCP.

## 2022-04-18 ENCOUNTER — OFFICE VISIT (OUTPATIENT)
Dept: INTERNAL MEDICINE | Facility: CLINIC | Age: 82
End: 2022-04-18
Payer: MEDICARE

## 2022-04-18 VITALS
DIASTOLIC BLOOD PRESSURE: 68 MMHG | HEIGHT: 67 IN | TEMPERATURE: 98 F | BODY MASS INDEX: 25.97 KG/M2 | SYSTOLIC BLOOD PRESSURE: 124 MMHG | OXYGEN SATURATION: 98 % | HEART RATE: 77 BPM | WEIGHT: 165.44 LBS | RESPIRATION RATE: 16 BRPM

## 2022-04-18 DIAGNOSIS — R42 DIZZINESS: Primary | ICD-10-CM

## 2022-04-18 PROCEDURE — 3074F PR MOST RECENT SYSTOLIC BLOOD PRESSURE < 130 MM HG: ICD-10-PCS | Mod: CPTII,S$GLB,, | Performed by: NURSE PRACTITIONER

## 2022-04-18 PROCEDURE — 99214 PR OFFICE/OUTPT VISIT, EST, LEVL IV, 30-39 MIN: ICD-10-PCS | Mod: S$GLB,,, | Performed by: NURSE PRACTITIONER

## 2022-04-18 PROCEDURE — 3078F PR MOST RECENT DIASTOLIC BLOOD PRESSURE < 80 MM HG: ICD-10-PCS | Mod: CPTII,S$GLB,, | Performed by: NURSE PRACTITIONER

## 2022-04-18 PROCEDURE — 3074F SYST BP LT 130 MM HG: CPT | Mod: CPTII,S$GLB,, | Performed by: NURSE PRACTITIONER

## 2022-04-18 PROCEDURE — 99214 OFFICE O/P EST MOD 30 MIN: CPT | Mod: S$GLB,,, | Performed by: NURSE PRACTITIONER

## 2022-04-18 PROCEDURE — 99999 PR PBB SHADOW E&M-EST. PATIENT-LVL III: CPT | Mod: PBBFAC,,, | Performed by: NURSE PRACTITIONER

## 2022-04-18 PROCEDURE — 99999 PR PBB SHADOW E&M-EST. PATIENT-LVL III: ICD-10-PCS | Mod: PBBFAC,,, | Performed by: NURSE PRACTITIONER

## 2022-04-18 PROCEDURE — 3078F DIAST BP <80 MM HG: CPT | Mod: CPTII,S$GLB,, | Performed by: NURSE PRACTITIONER

## 2022-04-18 RX ORDER — CEFDINIR 300 MG/1
300 CAPSULE ORAL
COMMUNITY
Start: 2022-04-13 | End: 2022-04-20

## 2022-04-18 NOTE — PROGRESS NOTES
[facility] Note    Chief Complaint      Chief Complaint   Patient presents with    Hospital Follow Up    Dizziness    Numbness     Right hand      History of Present Illness      Rob Mckeon is a 82 y.o. male patient of Dr. coon who presents today for ER f/u after c/o dizziness. Pt went to [facility] emergency room. Symptoms better, numbness is almost resolved, maybe do to over use of hand- pt reports he was trying to open a valve on the sprinkler when symptoms occurred.   Right fingers numb    [facility] ER note:  Reason for Consult:  Evaluation for neuropathy, ear infection/mastoiditis    Consulting Physician:  Dr. Mane    History of Present Illness: 4/13/22  82-year-old man with a past medical history chronic AFib, hyperlipidemia who presents to the emergency department with complaints of numbness in the tips of his fingers on his right hand and some intermittent gait instability. Patient states that for the last several days he has been working in his yd installing sprinkler system. This is consisted of screwing together sprinkler pipe fittings. He also notes that occasionally when he stands to walk he has a sense of imbalance causing him to side step. This is intermittent and not sustained. He denies any visual disturbance, headaches, difficulty speaking or swallowing, focal weakness. He describes the numbness as simply a loss of sensation in his fingertips. He denies any trauma, fall, etc.. He reports a history of recurrent ear infections as a child leaving him with some chronic scarring of the middle ear. He reports a lot of sinus drainage over the last couple of weeks.    Diagnostic Results:  CT Head:   Impression: No acute intracranial abnormality. Right mastoiditis/otitis media.      Assessment and Plan:  A 82 y.o. male with a past history of atrial fibrillation and hyperlipidemia who presents primarily for a complaint of numbness in his fingertips. He did relay some intermittent gait  unsteadiness is well prompting a head CT. The head CT was negative for acute infarction or hemorrhage. He did have noted right-sided otitis media and mastoiditis. Labs are unremarkable. The patient is nontoxic appearing. We discussed that he likely has some peripheral neuropathy or inflammation from the repetitive nature of the yd work he has been doing. As suggested rest as well as a short course of ibuprofen for inflammation. I do not think he has a central neurologic process and further advanced imaging unlikely to lead to change in treatment. The otitis media/mastoiditis can be treated with oral antibiotics at this time. I recommend 7 days of Omnicef for similar oral cephalosporin. The patient is amenable to follow-up with his primary care provider if the numbness in his hands does not resolve over the next several days. He and his wife for in agreement and prefer discharge home today. Recommendations were communicated with the attending ED provider. No acute need for admission at this time.          Health Maintenance   Topic Date Due    TETANUS VACCINE  Never done    Lipid Panel  06/07/2026       Past Medical History:   Diagnosis Date    Pericarditis        Past Surgical History:   Procedure Laterality Date    CHOLECYSTECTOMY      HERNIA REPAIR         family history includes No Known Problems in his father and mother.     Social History     Tobacco Use    Smoking status: Former Smoker    Smokeless tobacco: Never Used   Substance Use Topics    Alcohol use: Not Currently       Review of Systems   Constitutional: Negative for chills and fever.   Respiratory: Negative for shortness of breath.    Cardiovascular: Negative for chest pain.   Gastrointestinal: Negative for nausea.   Musculoskeletal: Negative for myalgias.   Neurological: Positive for dizziness. Negative for weakness and headaches.        Outpatient Encounter Medications as of 4/18/2022   Medication Sig Dispense Refill    aspirin (ECOTRIN) 81  "MG EC tablet Take 81 mg by mouth.      atorvastatin (LIPITOR) 20 MG tablet Take 1 tablet (20 mg total) by mouth once daily. 90 tablet 3    [] cefdinir (OMNICEF) 300 MG capsule Take 300 mg by mouth.      cetirizine (ZYRTEC) 10 MG tablet Take 10 mg by mouth.      cholecalciferol, vitamin D3, 10 mcg (400 unit) Cap Take by mouth.      cholecalciferol, vitamin D3, 400 unit Cap Take 400 Int'l Units by mouth.      diltiaZEM (CARDIZEM CD) 120 MG Cp24 TAKE 1 CAPSULE EVERY DAY 90 capsule 3    zolpidem (AMBIEN) 10 mg Tab Take 1 tablet (10 mg total) by mouth every evening. 90 tablet 1     No facility-administered encounter medications on file as of 2022.       Review of patient's allergies indicates:  No Known Allergies    Physical Exam      Vital Signs  Temp: 97.7 °F (36.5 °C)  Temp src: Oral  Pulse: 77  Resp: 16  SpO2: 98 %  BP: 124/68  BP Location: Left arm  Patient Position: Sitting  Height and Weight  Height: 5' 7" (170.2 cm)  Weight: 75 kg (165 lb 7.3 oz)  BSA (Calculated - sq m): 1.88 sq meters  BMI (Calculated): 25.9  Weight in (lb) to have BMI = 25: 159.3    Physical Exam  Vitals and nursing note reviewed.   Constitutional:       Appearance: Normal appearance.   HENT:      Head: Normocephalic and atraumatic.   Cardiovascular:      Rate and Rhythm: Normal rate and regular rhythm.      Heart sounds: Normal heart sounds.   Pulmonary:      Effort: Pulmonary effort is normal. No respiratory distress.   Skin:     General: Skin is warm and dry.   Neurological:      Mental Status: He is alert and oriented to person, place, and time.   Psychiatric:         Mood and Affect: Mood normal.         Behavior: Behavior normal.         Thought Content: Thought content normal.         Judgment: Judgment normal.          Laboratory:  CBC:  Lab Results   Component Value Date    WBC 7.66 2021    RBC 5.73 2021    HGB 17.1 2021    HCT 53.1 2021     2021    MCV 93 2021    MCH 29.8 " 06/07/2021    MCHC 32.2 06/07/2021    MCHC 32.1 04/29/2020     CMP:  Lab Results   Component Value Date     06/07/2021    CALCIUM 9.8 06/07/2021    ALBUMIN 4.3 06/07/2021    PROT 7.6 06/07/2021     06/07/2021    K 5.8 (H) 06/07/2021    CO2 34 (H) 06/07/2021     06/07/2021    BUN 21 (H) 06/07/2021    ALKPHOS 123 06/07/2021    ALT 22 06/07/2021    AST 34 06/07/2021    BILITOT 0.9 06/07/2021    BILITOT 0.8 04/29/2020     URINALYSIS:  No results found for: COLORU, CLARITYU, SPECGRAV, PHUR, PROTEINUA, GLUCOSEU, BILIRUBINCON, BLOODU, WBCU, RBCU, BACTERIA, MUCUS, NITRITE, LEUKOCYTESUR, UROBILINOGEN, HYALINECASTS   LIPIDS:  Lab Results   Component Value Date    HDL 38 (L) 06/07/2021    HDL 42 12/09/2019    CHOL 101 (L) 06/07/2021    CHOL 100 12/09/2019    TRIG 106 06/07/2021    LDLCALC 41.8 (L) 06/07/2021    LDLCALC 41 12/09/2019    CHOLHDL 37.6 06/07/2021    NONHDLCHOL 63 06/07/2021    TOTALCHOLEST 2.7 06/07/2021     TSH:  No results found for: TSH  A1C:  No results found for: HGBA1C      Assessment/Plan     Rob Mckeon is a 82 y.o.male with:    Dizziness       Dizziness resolved  Right hand numbness resolving  I spent 30 minutes on the day of this encounter for preparing for, evaluating, treating, and managing this patient.        -Continue current medications and maintain follow up with specialists.  Return to clinic as needed with Dr. Cabrera for any concerns No follow-ups on file.      Erin Jiminez, NP-C [facility]

## 2022-05-12 ENCOUNTER — OFFICE VISIT (OUTPATIENT)
Dept: INTERNAL MEDICINE | Facility: CLINIC | Age: 82
End: 2022-05-12
Payer: MEDICARE

## 2022-05-12 VITALS
OXYGEN SATURATION: 93 % | SYSTOLIC BLOOD PRESSURE: 130 MMHG | WEIGHT: 163.81 LBS | DIASTOLIC BLOOD PRESSURE: 62 MMHG | BODY MASS INDEX: 25.71 KG/M2 | HEIGHT: 67 IN | HEART RATE: 66 BPM

## 2022-05-12 DIAGNOSIS — H70.009: ICD-10-CM

## 2022-05-12 DIAGNOSIS — R20.2 PARESTHESIA OF RIGHT ARM: Primary | ICD-10-CM

## 2022-05-12 DIAGNOSIS — H66.90 ACUTE OTITIS MEDIA, UNSPECIFIED OTITIS MEDIA TYPE: ICD-10-CM

## 2022-05-12 PROCEDURE — 99214 PR OFFICE/OUTPT VISIT, EST, LEVL IV, 30-39 MIN: ICD-10-PCS | Mod: S$GLB,,, | Performed by: INTERNAL MEDICINE

## 2022-05-12 PROCEDURE — 1126F AMNT PAIN NOTED NONE PRSNT: CPT | Mod: CPTII,S$GLB,, | Performed by: INTERNAL MEDICINE

## 2022-05-12 PROCEDURE — 1126F PR PAIN SEVERITY QUANTIFIED, NO PAIN PRESENT: ICD-10-PCS | Mod: CPTII,S$GLB,, | Performed by: INTERNAL MEDICINE

## 2022-05-12 PROCEDURE — 99999 PR PBB SHADOW E&M-EST. PATIENT-LVL III: ICD-10-PCS | Mod: PBBFAC,,, | Performed by: INTERNAL MEDICINE

## 2022-05-12 PROCEDURE — 1159F PR MEDICATION LIST DOCUMENTED IN MEDICAL RECORD: ICD-10-PCS | Mod: CPTII,S$GLB,, | Performed by: INTERNAL MEDICINE

## 2022-05-12 PROCEDURE — 3075F PR MOST RECENT SYSTOLIC BLOOD PRESS GE 130-139MM HG: ICD-10-PCS | Mod: CPTII,S$GLB,, | Performed by: INTERNAL MEDICINE

## 2022-05-12 PROCEDURE — 99214 OFFICE O/P EST MOD 30 MIN: CPT | Mod: S$GLB,,, | Performed by: INTERNAL MEDICINE

## 2022-05-12 PROCEDURE — 1160F PR REVIEW ALL MEDS BY PRESCRIBER/CLIN PHARMACIST DOCUMENTED: ICD-10-PCS | Mod: CPTII,S$GLB,, | Performed by: INTERNAL MEDICINE

## 2022-05-12 PROCEDURE — 99999 PR PBB SHADOW E&M-EST. PATIENT-LVL III: CPT | Mod: PBBFAC,,, | Performed by: INTERNAL MEDICINE

## 2022-05-12 PROCEDURE — 1159F MED LIST DOCD IN RCRD: CPT | Mod: CPTII,S$GLB,, | Performed by: INTERNAL MEDICINE

## 2022-05-12 PROCEDURE — 3075F SYST BP GE 130 - 139MM HG: CPT | Mod: CPTII,S$GLB,, | Performed by: INTERNAL MEDICINE

## 2022-05-12 PROCEDURE — 3078F DIAST BP <80 MM HG: CPT | Mod: CPTII,S$GLB,, | Performed by: INTERNAL MEDICINE

## 2022-05-12 PROCEDURE — 3078F PR MOST RECENT DIASTOLIC BLOOD PRESSURE < 80 MM HG: ICD-10-PCS | Mod: CPTII,S$GLB,, | Performed by: INTERNAL MEDICINE

## 2022-05-12 PROCEDURE — 1160F RVW MEDS BY RX/DR IN RCRD: CPT | Mod: CPTII,S$GLB,, | Performed by: INTERNAL MEDICINE

## 2022-05-12 NOTE — PROGRESS NOTES
[facility] Note    Chief Complaint      Chief Complaint   Patient presents with    Numbness     In fingers       History of Present Illness      Rob Mckeon is a 82 y.o. male with chronic conditions of A fib, HTN, HLD, elevated PSA, insomnia, allergic rhinitis who presents today for: ER follow up.  Presented to ER on 4/13/2022 for numbness in the tips of fingers 1-5 on his right hand and slight gait instability. Prior to this, had been working in the yard installing sprinkler system involving screwing together pipe fittings.  CT head in ER did not show any acute changes but did show right otitis media and mastoiditis.  Given cefdinir x 7 days on discharge to treat AOM.    Flu shot UTD.  Prevnar, pneumovax UTD.  COVID vaccine UTD.  Shingrix vaccine UTD.  Cscope Dr. Toscano, unsure when, will get record. Hx of polyp, 5 yr interval.    Past Medical History:  Past Medical History:   Diagnosis Date    Pericarditis        Past Surgical History:   has a past surgical history that includes Cholecystectomy and Hernia repair.    Family History:  family history includes No Known Problems in his father and mother.     Social History:  Social History     Tobacco Use    Smoking status: Former Smoker    Smokeless tobacco: Never Used   Substance Use Topics    Alcohol use: Not Currently       I personally reviewed all past medical, surgical, social and family history.    Review of Systems   Constitutional: Negative for chills, fever and malaise/fatigue.   Respiratory: Negative for shortness of breath.    Cardiovascular: Negative for chest pain.   Gastrointestinal: Negative for constipation, diarrhea, nausea and vomiting.   Skin: Negative for rash.   Neurological: Negative for weakness.   All other systems reviewed and are negative.       Medications:  Outpatient Encounter Medications as of 5/12/2022   Medication Sig Dispense Refill    aspirin (ECOTRIN) 81 MG EC tablet Take 81 mg by mouth.      atorvastatin  "(LIPITOR) 20 MG tablet Take 1 tablet (20 mg total) by mouth once daily. 90 tablet 3    cetirizine (ZYRTEC) 10 MG tablet Take 10 mg by mouth.      cholecalciferol, vitamin D3, 10 mcg (400 unit) Cap Take by mouth.      cholecalciferol, vitamin D3, 400 unit Cap Take 400 Int'l Units by mouth.      diltiaZEM (CARDIZEM CD) 120 MG Cp24 TAKE 1 CAPSULE EVERY DAY 90 capsule 3    zolpidem (AMBIEN) 10 mg Tab Take 1 tablet (10 mg total) by mouth every evening. 90 tablet 1     No facility-administered encounter medications on file as of 5/12/2022.       Allergies:  Review of patient's allergies indicates:  No Known Allergies    Health Maintenance:  Immunization History   Administered Date(s) Administered    COVID-19, MRNA, LN-S, PF (Pfizer) (Purple Cap) 02/10/2021, 03/03/2021, 10/04/2021    Influenza 11/14/2005, 09/16/2009, 09/28/2010, 09/26/2017, 09/14/2018    Influenza (FLUAD) - Quadrivalent - Adjuvanted - PF *Preferred* (65+) 10/02/2020    Influenza (FLUAD) - Trivalent - Adjuvanted - PF (65+) 09/26/2017, 09/14/2018, 10/08/2019    Influenza - Quadrivalent - High Dose - PF (65 years and older) 11/09/2021    Influenza - Quadrivalent - PF *Preferred* (6 months and older) 11/14/2005, 09/16/2009, 09/28/2010    Pneumococcal Conjugate - 13 Valent 05/30/2015    Pneumococcal Polysaccharide - 23 Valent 09/14/2018    Zoster 10/02/2014    Zoster Recombinant 11/07/2018, 03/11/2019      Health Maintenance   Topic Date Due    TETANUS VACCINE  Never done    Lipid Panel  06/07/2026        Physical Exam      Vital Signs  Pulse: 66  SpO2: (!) 93 %  BP: 130/62  BP Location: Left arm  Patient Position: Sitting  Pain Score: 0-No pain  Height and Weight  Height: 5' 7" (170.2 cm)  Weight: 74.3 kg (163 lb 12.8 oz)  BSA (Calculated - sq m): 1.87 sq meters  BMI (Calculated): 25.6  Weight in (lb) to have BMI = 25: 159.3]    Physical Exam  Vitals reviewed.   Constitutional:       Appearance: He is well-developed.   HENT:      Head: " Normocephalic and atraumatic.      Right Ear: External ear normal.      Left Ear: External ear normal.   Cardiovascular:      Rate and Rhythm: Normal rate and regular rhythm.      Heart sounds: Normal heart sounds. No murmur heard.  Pulmonary:      Effort: Pulmonary effort is normal.      Breath sounds: Normal breath sounds. No wheezing or rales.   Abdominal:      General: Bowel sounds are normal.      Palpations: Abdomen is soft.          Laboratory:  CBC:  Recent Labs   Lab 04/29/20  1140 06/07/21  0958   WBC 7.26 7.66   RBC 5.93 5.73   Hemoglobin 17.7 17.1   Hematocrit 55.2 H 53.1   Platelets 264 214   MCV 93 93   MCH 29.8 29.8   MCHC 32.1 32.2     CMP:  Recent Labs   Lab 04/29/20  1140 06/07/21  0958   Glucose 82 103   Calcium 10.1 9.8   Albumin 4.5 4.3   Total Protein 8.0 7.6   Sodium 142 142   Potassium 6.2 H 5.8 H   CO2 33 H 34 H   Chloride 102 102   BUN 16 21 H   Alkaline Phosphatase 132 H 123   ALT 29 22   AST 35 34   Total Bilirubin 0.8 0.9     URINALYSIS:       LIPIDS:  Recent Labs   Lab 12/09/19  0000 06/07/21  0954   HDL 42 38 L   Cholesterol 100 101 L   Triglycerides  --  106   LDL Cholesterol 41 41.8 L   HDL/Cholesterol Ratio  --  37.6   Non-HDL Cholesterol  --  63   Total Cholesterol/HDL Ratio  --  2.7     TSH:      A1C:        Assessment/Plan     Rob Mckeon is a 82 y.o.male with:    1. Paresthesia of right arm  - EMG W/ ULTRASOUND AND NERVE CONDUCTION TEST 1 Extremity; Future  Will send for EMG to further evaluate for carpal tunnel, ulnar neuropathy, vs cervical radiculopathy.    2. Acute mastoiditis, unspecified laterality  3. Acute otitis media, unspecified otitis media type  Resolved with cefdinir.  Symptoms of imbalance have improved.     Chronic conditions status updated as per HPI.  Other than changes above, cont current medications and maintain follow up with specialists.  No follow-ups on file.    Future Appointments   Date Time Provider Department Center   7/13/2022  1:45 PM Rc JACOBSON  [facility]       Rc Cabrera MD  [facility]

## 2022-07-05 ENCOUNTER — PROCEDURE VISIT (OUTPATIENT)
Dept: NEUROLOGY | Facility: CLINIC | Age: 82
End: 2022-07-05
Payer: MEDICARE

## 2022-07-05 DIAGNOSIS — R20.2 PARESTHESIA OF RIGHT ARM: ICD-10-CM

## 2022-07-05 PROCEDURE — 95886 PR EMG COMPLETE, W/ NERVE CONDUCTION STUDIES, 5+ MUSCLES: ICD-10-PCS | Mod: S$GLB,,, | Performed by: PSYCHIATRY & NEUROLOGY

## 2022-07-05 PROCEDURE — 95913 PR NERVE CONDUCTION STUDY; 13 OR MORE STUDIES: ICD-10-PCS | Mod: S$GLB,,, | Performed by: PSYCHIATRY & NEUROLOGY

## 2022-07-05 PROCEDURE — 95886 MUSC TEST DONE W/N TEST COMP: CPT | Mod: S$GLB,,, | Performed by: PSYCHIATRY & NEUROLOGY

## 2022-07-05 PROCEDURE — 95913 NRV CNDJ TEST 13/> STUDIES: CPT | Mod: S$GLB,,, | Performed by: PSYCHIATRY & NEUROLOGY

## 2022-07-11 DIAGNOSIS — M54.12 CERVICAL RADICULOPATHY: Primary | ICD-10-CM

## 2022-07-12 NOTE — PROGRESS NOTES
Nerve conduction study suggests his numbness is coming from nerve impingement in the cervical spine.  Recommend seeing back and spine clinic.

## 2022-07-13 ENCOUNTER — OFFICE VISIT (OUTPATIENT)
Dept: INTERNAL MEDICINE | Facility: CLINIC | Age: 82
End: 2022-07-13
Payer: MEDICARE

## 2022-07-13 VITALS
TEMPERATURE: 98 F | BODY MASS INDEX: 25.51 KG/M2 | SYSTOLIC BLOOD PRESSURE: 124 MMHG | WEIGHT: 162.5 LBS | HEART RATE: 68 BPM | HEIGHT: 67 IN | DIASTOLIC BLOOD PRESSURE: 70 MMHG | OXYGEN SATURATION: 92 %

## 2022-07-13 DIAGNOSIS — R97.20 ELEVATED PSA: ICD-10-CM

## 2022-07-13 DIAGNOSIS — M54.12 CERVICAL RADICULOPATHY: ICD-10-CM

## 2022-07-13 DIAGNOSIS — E78.2 HYPERLIPIDEMIA, MIXED: ICD-10-CM

## 2022-07-13 DIAGNOSIS — I10 ESSENTIAL HYPERTENSION: ICD-10-CM

## 2022-07-13 DIAGNOSIS — R20.2 PARESTHESIA OF RIGHT ARM: ICD-10-CM

## 2022-07-13 DIAGNOSIS — I48.0 PAROXYSMAL ATRIAL FIBRILLATION: Primary | ICD-10-CM

## 2022-07-13 PROCEDURE — 1160F PR REVIEW ALL MEDS BY PRESCRIBER/CLIN PHARMACIST DOCUMENTED: ICD-10-PCS | Mod: CPTII,S$GLB,, | Performed by: INTERNAL MEDICINE

## 2022-07-13 PROCEDURE — 3078F PR MOST RECENT DIASTOLIC BLOOD PRESSURE < 80 MM HG: ICD-10-PCS | Mod: CPTII,S$GLB,, | Performed by: INTERNAL MEDICINE

## 2022-07-13 PROCEDURE — 1160F RVW MEDS BY RX/DR IN RCRD: CPT | Mod: CPTII,S$GLB,, | Performed by: INTERNAL MEDICINE

## 2022-07-13 PROCEDURE — 99214 OFFICE O/P EST MOD 30 MIN: CPT | Mod: S$GLB,,, | Performed by: INTERNAL MEDICINE

## 2022-07-13 PROCEDURE — 3078F DIAST BP <80 MM HG: CPT | Mod: CPTII,S$GLB,, | Performed by: INTERNAL MEDICINE

## 2022-07-13 PROCEDURE — 99214 PR OFFICE/OUTPT VISIT, EST, LEVL IV, 30-39 MIN: ICD-10-PCS | Mod: S$GLB,,, | Performed by: INTERNAL MEDICINE

## 2022-07-13 PROCEDURE — 3074F SYST BP LT 130 MM HG: CPT | Mod: CPTII,S$GLB,, | Performed by: INTERNAL MEDICINE

## 2022-07-13 PROCEDURE — 3074F PR MOST RECENT SYSTOLIC BLOOD PRESSURE < 130 MM HG: ICD-10-PCS | Mod: CPTII,S$GLB,, | Performed by: INTERNAL MEDICINE

## 2022-07-13 PROCEDURE — 1126F PR PAIN SEVERITY QUANTIFIED, NO PAIN PRESENT: ICD-10-PCS | Mod: CPTII,S$GLB,, | Performed by: INTERNAL MEDICINE

## 2022-07-13 PROCEDURE — 99999 PR PBB SHADOW E&M-EST. PATIENT-LVL III: CPT | Mod: PBBFAC,,, | Performed by: INTERNAL MEDICINE

## 2022-07-13 PROCEDURE — 1159F MED LIST DOCD IN RCRD: CPT | Mod: CPTII,S$GLB,, | Performed by: INTERNAL MEDICINE

## 2022-07-13 PROCEDURE — 1126F AMNT PAIN NOTED NONE PRSNT: CPT | Mod: CPTII,S$GLB,, | Performed by: INTERNAL MEDICINE

## 2022-07-13 PROCEDURE — 99999 PR PBB SHADOW E&M-EST. PATIENT-LVL III: ICD-10-PCS | Mod: PBBFAC,,, | Performed by: INTERNAL MEDICINE

## 2022-07-13 PROCEDURE — 1159F PR MEDICATION LIST DOCUMENTED IN MEDICAL RECORD: ICD-10-PCS | Mod: CPTII,S$GLB,, | Performed by: INTERNAL MEDICINE

## 2022-07-13 NOTE — PROGRESS NOTES
[facility] Note    Chief Complaint      Chief Complaint   Patient presents with    Hypertension     6 month f/u       History of Present Illness      Rob Mckeon is a 82 y.o. male with chronic conditions of A fib, HTN, HLD, elevated PSA, insomnia, allergic rhinitis who presents today for: follow up chronic conditions.  Has had worsening of upper extremity numbness and pain.    A fib: Sees Dr. Cavazos.  On ASA for anticoagulation. Diltiazem for rate control.  Denies chest pain, shortness ofbreath, palpitations.  HTN: BP at goal on diltiazem.  HLD: Controlled on lipitor.  LDL 41.  Insomnia: Contorlled on ambien.  No new complaints.  Elevated PSA: PSA 4.6 last check.  No new or worsening obstructive symptoms.    Allergic rhinitis: Controlled with mucinex as needed and zyrtec daily.   Flu shot UTD.  Prevnar, pneumovax UTD.  COVID vaccine UTD.  Shingrix vaccine UTD.  Cscope Dr. Toscano, unsure when, will get record. Hx of polyp, 5 yr interval.    Past Medical History:  Past Medical History:   Diagnosis Date    Pericarditis        Past Surgical History:   has a past surgical history that includes Cholecystectomy and Hernia repair.    Family History:  family history includes No Known Problems in his father and mother.     Social History:  Social History     Tobacco Use    Smoking status: Former Smoker    Smokeless tobacco: Never Used   Substance Use Topics    Alcohol use: Not Currently       I personally reviewed all past medical, surgical, social and family history.    Review of Systems   Constitutional: Negative for chills, fever and malaise/fatigue.   Respiratory: Negative for shortness of breath.    Cardiovascular: Negative for chest pain.   Gastrointestinal: Negative for constipation, diarrhea, nausea and vomiting.   Skin: Negative for rash.   Neurological: Negative for weakness.   All other systems reviewed and are negative.       Medications:  Outpatient Encounter Medications as of 7/13/2022  "  Medication Sig Dispense Refill    aspirin (ECOTRIN) 81 MG EC tablet Take 81 mg by mouth.      atorvastatin (LIPITOR) 20 MG tablet TAKE 1 TABLET EVERY DAY 90 tablet 3    cetirizine (ZYRTEC) 10 MG tablet Take 10 mg by mouth.      cholecalciferol, vitamin D3, 10 mcg (400 unit) Cap Take by mouth.      cholecalciferol, vitamin D3, 400 unit Cap Take 400 Int'l Units by mouth.      diltiaZEM (CARDIZEM CD) 120 MG Cp24 TAKE 1 CAPSULE EVERY DAY 90 capsule 3    zolpidem (AMBIEN) 10 mg Tab Take 1 tablet (10 mg total) by mouth every evening. 90 tablet 1     No facility-administered encounter medications on file as of 7/13/2022.       Allergies:  Review of patient's allergies indicates:  No Known Allergies    Health Maintenance:  Immunization History   Administered Date(s) Administered    COVID-19, MRNA, LN-S, PF (Pfizer) (Purple Cap) 02/10/2021, 03/03/2021, 10/04/2021    Influenza 11/14/2005, 09/16/2009, 09/28/2010, 09/26/2017, 09/14/2018    Influenza (FLUAD) - Quadrivalent - Adjuvanted - PF *Preferred* (65+) 10/02/2020    Influenza (FLUAD) - Trivalent - Adjuvanted - PF (65+) 09/26/2017, 09/14/2018, 10/08/2019    Influenza - Quadrivalent - High Dose - PF (65 years and older) 11/09/2021    Influenza - Quadrivalent - PF *Preferred* (6 months and older) 11/14/2005, 09/16/2009, 09/28/2010    Pneumococcal Conjugate - 13 Valent 05/30/2015    Pneumococcal Polysaccharide - 23 Valent 09/14/2018    Zoster 10/02/2014    Zoster Recombinant 11/07/2018, 03/11/2019      Health Maintenance   Topic Date Due    TETANUS VACCINE  Never done    Lipid Panel  06/07/2026        Physical Exam      Vital Signs  Temp: 97.6 °F (36.4 °C)  Temp src: Oral  Pulse: 68  SpO2: (!) 92 %  BP: 124/70  BP Location: Right arm  Patient Position: Sitting  Pain Score: 0-No pain  Height and Weight  Height: 5' 7" (170.2 cm)  Weight: 73.7 kg (162 lb 7.7 oz)  BSA (Calculated - sq m): 1.87 sq meters  BMI (Calculated): 25.4  Weight in (lb) to have BMI = 25: " 159.3]    Physical Exam  Vitals reviewed.   Constitutional:       Appearance: He is well-developed.   HENT:      Head: Normocephalic and atraumatic.      Right Ear: External ear normal.      Left Ear: External ear normal.   Cardiovascular:      Rate and Rhythm: Normal rate and regular rhythm.      Heart sounds: Normal heart sounds. No murmur heard.  Pulmonary:      Effort: Pulmonary effort is normal.      Breath sounds: Normal breath sounds. No wheezing or rales.   Abdominal:      General: Bowel sounds are normal.      Palpations: Abdomen is soft.          Laboratory:  CBC:  Recent Labs   Lab 04/29/20  1140 06/07/21  0958   WBC 7.26 7.66   RBC 5.93 5.73   Hemoglobin 17.7 17.1   Hematocrit 55.2 H 53.1   Platelets 264 214   MCV 93 93   MCH 29.8 29.8   MCHC 32.1 32.2     CMP:  Recent Labs   Lab 04/29/20  1140 06/07/21  0958   Glucose 82 103   Calcium 10.1 9.8   Albumin 4.5 4.3   Total Protein 8.0 7.6   Sodium 142 142   Potassium 6.2 H 5.8 H   CO2 33 H 34 H   Chloride 102 102   BUN 16 21 H   Alkaline Phosphatase 132 H 123   ALT 29 22   AST 35 34   Total Bilirubin 0.8 0.9     URINALYSIS:       LIPIDS:  Recent Labs   Lab 12/09/19  0000 06/07/21  0954   HDL 42 38 L   Cholesterol 100 101 L   Triglycerides  --  106   LDL Cholesterol 41 41.8 L   HDL/Cholesterol Ratio  --  37.6   Non-HDL Cholesterol  --  63   Total Cholesterol/HDL Ratio  --  2.7     TSH:      A1C:        Assessment/Plan     Rob Mckeon is a 82 y.o.male with:    1. Paroxysmal atrial fibrillation  Continue current meds.    2. Essential hypertension  Continue current meds.    3. Hyperlipidemia, mixed  - Comprehensive Metabolic Panel; Future  - Lipid Panel; Future  Continue current meds.    4. Elevated PSA  - PSA, Total and Free; Future  Update labs.  5. Cervical radiculopathy  - X-Ray Cervical Spine Complete 5 view; Future  6. Paresthesia of right arm  - X-Ray Cervical Spine Complete 5 view; Future  Check Xrays and refer to back and spine clinic.     Chronic  conditions status updated as per HPI.  Other than changes above, cont current medications and maintain follow up with specialists.  No follow-ups on file.    Future Appointments   Date Time Provider Department Center   7/14/2022  9:05 AM LAB, [facility]   7/14/2022  9:45 AM [facility] OIC-XRAY [facility]   1/17/2023  1:45 PM Rc Cabrera MD [facility]       Rc Cabrera MD  [facility]

## 2022-07-14 ENCOUNTER — HOSPITAL ENCOUNTER (OUTPATIENT)
Dept: RADIOLOGY | Facility: HOSPITAL | Age: 82
Discharge: HOME | End: 2022-07-14
Attending: INTERNAL MEDICINE
Payer: MEDICARE

## 2022-07-14 DIAGNOSIS — R20.2 PARESTHESIA OF RIGHT ARM: ICD-10-CM

## 2022-07-14 DIAGNOSIS — M54.12 CERVICAL RADICULOPATHY: ICD-10-CM

## 2022-07-14 PROCEDURE — 72050 X-RAY EXAM NECK SPINE 4/5VWS: CPT | Mod: TC

## 2022-07-14 PROCEDURE — 72050 XR CERVICAL SPINE COMPLETE 5 VIEW: ICD-10-PCS | Mod: 26,,, | Performed by: STUDENT IN AN ORGANIZED HEALTH CARE EDUCATION/TRAINING PROGRAM

## 2022-07-14 PROCEDURE — 72050 X-RAY EXAM NECK SPINE 4/5VWS: CPT | Mod: 26,,, | Performed by: STUDENT IN AN ORGANIZED HEALTH CARE EDUCATION/TRAINING PROGRAM

## 2022-07-14 NOTE — PROGRESS NOTES
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  F/U with back and spine clinic as referred.

## 2022-07-19 ENCOUNTER — TELEPHONE (OUTPATIENT)
Dept: INTERNAL MEDICINE | Facility: CLINIC | Age: 82
End: 2022-07-19
Payer: MEDICARE

## 2022-07-19 NOTE — TELEPHONE ENCOUNTER
----- Message from Daljit Fried sent at 7/19/2022  8:51 AM CDT -----  Regarding: test results  Type:  Patient Returning Call    Who Called:patient wife    Who Left Message for Patient:n/a    Does the patient know what this is regarding?:would like July 14th lab results mailed to home. Patient is unable to access [employer]    Would the patient rather a call back or a response via [facility]? N/a    Best Call Back Number:n/a    Additional Information: n/a

## 2022-07-25 NOTE — PROGRESS NOTES
Subjective:      Patient ID: Rob Mckeon is a 82 y.o. male.    Chief Complaint: No chief complaint on file.    Mr Mckeon is an 81 yo male sent in consultation by Dr. Cabrera for evaluation of neck pain and parasthesias in right arm.  He has some numbness in the tips of his fingers on the right for 3 months.  He was repairing his sprinkler system and he felt something in the neck and noticed  The tips of all fingers from the knuckle to the tip.  The numbness does not change.  It is a tingling cold feeling.  He will feel it more if uses his hand more during the day.  He does not feel it more at night, and does not wake him up.  There is no pain in the neck.  There is no balance changes, but does feel like old age and does not walk as good as he use to.  There is no real pain.     X-ray cervical 7/2022  C1-C2: Pre-dens space is maintained.  Dens and lateral masses of C1 are unremarkable.     Alignment: Alignment is maintained.     Vertebrae: Vertebral body heights are maintained.  No suspicious appearing lytic or blastic lesions.     Discs and facets: Moderate disc spaces narrowing at C4 through C7 noting degenerative endplate changes and osteophyte formation.  Minimal facet arthropathy.  Neural foramina are maintained noting probable mild bilateral narrowing at C4 through C7 which is more prominent on the right.     Miscellaneous: Prevertebral soft tissues are unremarkable.  Prominent vascular calcifications throughout the bilateral neck.     Impression:     As above.    EMG 2022  1. Mild right carpal tunnel syndrome (median neuropathy at the wrist),  2. Active or ongoing denervation in the right C5 and/or C6 myotomes suggestive of radiculopathy at those levels.    Past Medical History:  No date: Pericarditis    Past Surgical History:  No date: CHOLECYSTECTOMY  No date: HERNIA REPAIR    Review of patient's family history indicates:  Problem: No Known Problems      Relation: Mother          Age of Onset: (Not  Specified)  Problem: No Known Problems      Relation: Father          Age of Onset: (Not Specified)      Social History    Socioeconomic History      Marital status:     Tobacco Use      Smoking status: Former Smoker      Smokeless tobacco: Never Used    Substance and Sexual Activity      Alcohol use: Not Currently      Current Outpatient Medications:  aspirin (ECOTRIN) 81 MG EC tablet, Take 81 mg by mouth., Disp: , Rfl:   atorvastatin (LIPITOR) 20 MG tablet, TAKE 1 TABLET EVERY DAY, Disp: 90 tablet, Rfl: 3  cetirizine (ZYRTEC) 10 MG tablet, Take 10 mg by mouth., Disp: , Rfl:   cholecalciferol, vitamin D3, 10 mcg (400 unit) Cap, Take by mouth., Disp: , Rfl:   cholecalciferol, vitamin D3, 400 unit Cap, Take 400 Int'l Units by mouth., Disp: , Rfl:   diltiaZEM (CARDIZEM CD) 120 MG Cp24, TAKE 1 CAPSULE EVERY DAY, Disp: 90 capsule, Rfl: 3  zolpidem (AMBIEN) 10 mg Tab, Take 1 tablet (10 mg total) by mouth every evening., Disp: 90 tablet, Rfl: 1    No current facility-administered medications for this visit.      Review of patient's allergies indicates:  No Known Allergies        Review of Systems   Constitutional: Negative for weight gain and weight loss.   Cardiovascular: Negative for chest pain.   Respiratory: Negative for shortness of breath.    Musculoskeletal: Negative for back pain, joint pain, joint swelling and neck pain.   Gastrointestinal: Negative for abdominal pain and bowel incontinence.   Genitourinary: Negative for bladder incontinence.   Neurological: Positive for numbness (right finger tips) and paresthesias.         Objective:        General: Rob ROY is well-developed, well-nourished, appears stated age, in no acute distress, alert and oriented to time, place and person.     General    Vitals reviewed.  Constitutional: He is oriented to person, place, and time. He appears well-developed and well-nourished.   HENT:   Head: Normocephalic and atraumatic.   Pulmonary/Chest: Effort normal.    Neurological: He is alert and oriented to person, place, and time.   Psychiatric: He has a normal mood and affect. His behavior is normal. Judgment and thought content normal.     General Musculoskeletal Exam   Gait: normal     Right Ankle/Foot Exam     Tests   Heel Walk: able to perform  Tiptoe Walk: able to perform    Left Ankle/Foot Exam     Tests   Heel Walk: able to perform  Tiptoe Walk: able to perform  Back (L-Spine & T-Spine) / Neck (C-Spine) Exam     Neck (C-Spine) Range of Motion   Flexion:     40  Extension: 30 (neck pain)Right Lateral Bend: 20 Left Lateral Bend: 20     Spinal Sensation   Right Side Sensation  C-Spine Level: normal   L-Spine Level: normal  S-Spine Level: normal  Left Side Sensation  C-Spine Level: normal  L-Spine Level: normal  S-Spine Level: normal    Back (L-Spine & T-Spine) Tests   Right Side Tests  Straight leg raise:      Sitting SLR: > 70 degrees      Left Side Tests  Straight leg raise:     Sitting SLR: > 70 degrees          Other He has no scoliosis .  Spinal Kyphosis:  present      Right Hand/Wrist Exam     Tests   Tinel's sign (median nerve): positive            Muscle Strength   Right Upper Extremity   Biceps: 5/5   Deltoid:  5/5  Triceps:  5/5  Wrist extension: 5/5   Finger Flexors:  5/5  Left Upper Extremity  Biceps: 5/5   Deltoid:  5/5  Triceps:  5/5  Wrist extension: 5/5   Finger Flexors:  5/5  Right Lower Extremity   Hip Flexion: 5/5   Quadriceps:  5/5   Anterior tibial:  5/5   EHL:  5/5  Left Lower Extremity   Hip Flexion: 5/5   Quadriceps:  5/5   Anterior tibial:  5/5   EHL:  5/5    Reflexes     Left Side  Biceps:  2+  Triceps:  2+  Brachioradialis:  2+  Achilles:  2+  Left Youssef's Sign:  Absent  Babinski Sign:  absent  Quadriceps:  2+    Right Side   Biceps:  2+  Triceps:  2+  Brachioradialis:  2+  Achilles:  2+  Right Youssef's Sign:  absent  Babinski Sign:  absent  Quadriceps:  2+    Vascular Exam     Right Pulses        Carotid:                  2+    Left  Pulses        Carotid:                  2+              Assessment:       1. Carpal tunnel syndrome of right wrist    2. Cervical radiculopathy    3. Neuralgia and neuritis    4. Poor posture           Plan:       Orders Placed This Encounter    HME - OTHER     1. We discussed right hand numbness.  It is the tips of all fingers.  We discussed that it is not one thing that causes the pain but an accumulation of multiple things that we do.  We discussed neck does come forward and degenerative changes.    2. We discussed the EMG shows CTS and nerve irritation from neck.  We discussed tingling worse when using hands too much which makes it seem like hand tingling from the ct.  He does have some nerve irritation from neck but not sure that is what he is complaining about.  C5 and C6 does not cause numbness in all finger tips.    3. We discussed posture sitting and the importance of trying to sit better.  We discussed getting head over spine and watching posture  4. We discussed resting hand splint at night  5. We discussed the benefits of therapy and exercise and continuing to move.  His wife says he is in recliner too much. We discussed the importance of activity and exercise as we age.  We discussed walking, going to the gym, we discussed making it fun  6. We discussed getting MRI to look at neck.  They want to wait for now  7. RTC PRN    More than 50% of the total time  of 45 minutes was spent face to face in counseling on diagnosis and treatment options. I also counseled patient  on common and most usual side effect of prescribed medications.  I reviewed Primary care , and other specialty's notes to better coordinate patient's care. All questions were answered, and patient voiced understanding.     A consult note will be sent to Dr. Cabrera through epic.  Thanks for the consult        Follow-up: Follow up if symptoms worsen or fail to improve. If there are any questions prior to this, the patient was instructed to  contact the office.

## 2022-07-26 ENCOUNTER — OFFICE VISIT (OUTPATIENT)
Dept: SPINE | Facility: CLINIC | Age: 82
End: 2022-07-26
Attending: PHYSICAL MEDICINE & REHABILITATION
Payer: MEDICARE

## 2022-07-26 VITALS
SYSTOLIC BLOOD PRESSURE: 156 MMHG | DIASTOLIC BLOOD PRESSURE: 75 MMHG | HEIGHT: 67 IN | HEART RATE: 59 BPM | BODY MASS INDEX: 25.47 KG/M2 | RESPIRATION RATE: 18 BRPM | WEIGHT: 162.25 LBS

## 2022-07-26 DIAGNOSIS — M79.2 NEURALGIA AND NEURITIS: ICD-10-CM

## 2022-07-26 DIAGNOSIS — M54.12 CERVICAL RADICULOPATHY: ICD-10-CM

## 2022-07-26 DIAGNOSIS — R29.3 POOR POSTURE: ICD-10-CM

## 2022-07-26 DIAGNOSIS — G56.01 CARPAL TUNNEL SYNDROME OF RIGHT WRIST: Primary | ICD-10-CM

## 2022-07-26 PROCEDURE — 1126F AMNT PAIN NOTED NONE PRSNT: CPT | Mod: CPTII,S$GLB,, | Performed by: PHYSICAL MEDICINE & REHABILITATION

## 2022-07-26 PROCEDURE — 1101F PR PT FALLS ASSESS DOC 0-1 FALLS W/OUT INJ PAST YR: ICD-10-PCS | Mod: CPTII,S$GLB,, | Performed by: PHYSICAL MEDICINE & REHABILITATION

## 2022-07-26 PROCEDURE — 99999 PR PBB SHADOW E&M-EST. PATIENT-LVL III: CPT | Mod: PBBFAC,,, | Performed by: PHYSICAL MEDICINE & REHABILITATION

## 2022-07-26 PROCEDURE — 3078F DIAST BP <80 MM HG: CPT | Mod: CPTII,S$GLB,, | Performed by: PHYSICAL MEDICINE & REHABILITATION

## 2022-07-26 PROCEDURE — 1159F PR MEDICATION LIST DOCUMENTED IN MEDICAL RECORD: ICD-10-PCS | Mod: CPTII,S$GLB,, | Performed by: PHYSICAL MEDICINE & REHABILITATION

## 2022-07-26 PROCEDURE — 3078F PR MOST RECENT DIASTOLIC BLOOD PRESSURE < 80 MM HG: ICD-10-PCS | Mod: CPTII,S$GLB,, | Performed by: PHYSICAL MEDICINE & REHABILITATION

## 2022-07-26 PROCEDURE — 99204 PR OFFICE/OUTPT VISIT, NEW, LEVL IV, 45-59 MIN: ICD-10-PCS | Mod: S$GLB,,, | Performed by: PHYSICAL MEDICINE & REHABILITATION

## 2022-07-26 PROCEDURE — 1159F MED LIST DOCD IN RCRD: CPT | Mod: CPTII,S$GLB,, | Performed by: PHYSICAL MEDICINE & REHABILITATION

## 2022-07-26 PROCEDURE — 3288F PR FALLS RISK ASSESSMENT DOCUMENTED: ICD-10-PCS | Mod: CPTII,S$GLB,, | Performed by: PHYSICAL MEDICINE & REHABILITATION

## 2022-07-26 PROCEDURE — 3077F SYST BP >= 140 MM HG: CPT | Mod: CPTII,S$GLB,, | Performed by: PHYSICAL MEDICINE & REHABILITATION

## 2022-07-26 PROCEDURE — 3288F FALL RISK ASSESSMENT DOCD: CPT | Mod: CPTII,S$GLB,, | Performed by: PHYSICAL MEDICINE & REHABILITATION

## 2022-07-26 PROCEDURE — 99999 PR PBB SHADOW E&M-EST. PATIENT-LVL III: ICD-10-PCS | Mod: PBBFAC,,, | Performed by: PHYSICAL MEDICINE & REHABILITATION

## 2022-07-26 PROCEDURE — 1101F PT FALLS ASSESS-DOCD LE1/YR: CPT | Mod: CPTII,S$GLB,, | Performed by: PHYSICAL MEDICINE & REHABILITATION

## 2022-07-26 PROCEDURE — 99204 OFFICE O/P NEW MOD 45 MIN: CPT | Mod: S$GLB,,, | Performed by: PHYSICAL MEDICINE & REHABILITATION

## 2022-07-26 PROCEDURE — 1126F PR PAIN SEVERITY QUANTIFIED, NO PAIN PRESENT: ICD-10-PCS | Mod: CPTII,S$GLB,, | Performed by: PHYSICAL MEDICINE & REHABILITATION

## 2022-07-26 PROCEDURE — 3077F PR MOST RECENT SYSTOLIC BLOOD PRESSURE >= 140 MM HG: ICD-10-PCS | Mod: CPTII,S$GLB,, | Performed by: PHYSICAL MEDICINE & REHABILITATION

## 2022-10-19 DIAGNOSIS — F51.01 PRIMARY INSOMNIA: ICD-10-CM

## 2022-10-19 RX ORDER — ZOLPIDEM TARTRATE 10 MG/1
10 TABLET ORAL NIGHTLY
Qty: 90 TABLET | Refills: 1 | Status: SHIPPED | OUTPATIENT
Start: 2022-10-19 | End: 2023-04-28 | Stop reason: SDUPTHER

## 2022-10-19 NOTE — TELEPHONE ENCOUNTER
No new care gaps identified.  [facility] Embedded Care Gaps. Reference number: [id number]. 10/19/2022   11:47:32 AM CDT

## 2022-10-19 NOTE — TELEPHONE ENCOUNTER
----- Message from Lalo Little sent at 10/19/2022 11:28 AM CDT -----  Contact: [phone]  Who Called: PT    Regarding: pt is requesting a refill on his zolpidem (AMBIEN) 10 mg Tab , 90 pills    Would the patient rather a call back or a response via [facility]? Call back    Best Call Back Number: [phone]     Additional Information: [facility] - [place], [place] - [address]: [phone]

## 2023-01-17 ENCOUNTER — OFFICE VISIT (OUTPATIENT)
Dept: INTERNAL MEDICINE | Facility: CLINIC | Age: 83
End: 2023-01-17
Payer: MEDICARE

## 2023-01-17 VITALS
HEART RATE: 67 BPM | OXYGEN SATURATION: 90 % | SYSTOLIC BLOOD PRESSURE: 120 MMHG | HEIGHT: 67 IN | DIASTOLIC BLOOD PRESSURE: 60 MMHG | WEIGHT: 158.81 LBS | BODY MASS INDEX: 24.92 KG/M2

## 2023-01-17 DIAGNOSIS — R97.20 ELEVATED PSA: ICD-10-CM

## 2023-01-17 DIAGNOSIS — J30.9 ALLERGIC RHINITIS, UNSPECIFIED SEASONALITY, UNSPECIFIED TRIGGER: ICD-10-CM

## 2023-01-17 DIAGNOSIS — F51.01 PRIMARY INSOMNIA: ICD-10-CM

## 2023-01-17 DIAGNOSIS — E78.2 HYPERLIPIDEMIA, MIXED: ICD-10-CM

## 2023-01-17 DIAGNOSIS — I10 ESSENTIAL HYPERTENSION: Primary | ICD-10-CM

## 2023-01-17 DIAGNOSIS — I48.0 PAROXYSMAL ATRIAL FIBRILLATION: ICD-10-CM

## 2023-01-17 PROCEDURE — 99214 OFFICE O/P EST MOD 30 MIN: CPT | Mod: HCNC,S$GLB,, | Performed by: INTERNAL MEDICINE

## 2023-01-17 PROCEDURE — 99999 PR PBB SHADOW E&M-EST. PATIENT-LVL III: CPT | Mod: PBBFAC,HCNC,, | Performed by: INTERNAL MEDICINE

## 2023-01-17 PROCEDURE — 99214 PR OFFICE/OUTPT VISIT, EST, LEVL IV, 30-39 MIN: ICD-10-PCS | Mod: HCNC,S$GLB,, | Performed by: INTERNAL MEDICINE

## 2023-01-17 PROCEDURE — 3078F PR MOST RECENT DIASTOLIC BLOOD PRESSURE < 80 MM HG: ICD-10-PCS | Mod: HCNC,CPTII,S$GLB, | Performed by: INTERNAL MEDICINE

## 2023-01-17 PROCEDURE — 3074F SYST BP LT 130 MM HG: CPT | Mod: HCNC,CPTII,S$GLB, | Performed by: INTERNAL MEDICINE

## 2023-01-17 PROCEDURE — 1159F MED LIST DOCD IN RCRD: CPT | Mod: HCNC,CPTII,S$GLB, | Performed by: INTERNAL MEDICINE

## 2023-01-17 PROCEDURE — 1126F PR PAIN SEVERITY QUANTIFIED, NO PAIN PRESENT: ICD-10-PCS | Mod: HCNC,CPTII,S$GLB, | Performed by: INTERNAL MEDICINE

## 2023-01-17 PROCEDURE — 1159F PR MEDICATION LIST DOCUMENTED IN MEDICAL RECORD: ICD-10-PCS | Mod: HCNC,CPTII,S$GLB, | Performed by: INTERNAL MEDICINE

## 2023-01-17 PROCEDURE — 99999 PR PBB SHADOW E&M-EST. PATIENT-LVL III: ICD-10-PCS | Mod: PBBFAC,HCNC,, | Performed by: INTERNAL MEDICINE

## 2023-01-17 PROCEDURE — 1126F AMNT PAIN NOTED NONE PRSNT: CPT | Mod: HCNC,CPTII,S$GLB, | Performed by: INTERNAL MEDICINE

## 2023-01-17 PROCEDURE — 3074F PR MOST RECENT SYSTOLIC BLOOD PRESSURE < 130 MM HG: ICD-10-PCS | Mod: HCNC,CPTII,S$GLB, | Performed by: INTERNAL MEDICINE

## 2023-01-17 PROCEDURE — 3078F DIAST BP <80 MM HG: CPT | Mod: HCNC,CPTII,S$GLB, | Performed by: INTERNAL MEDICINE

## 2023-01-17 NOTE — PROGRESS NOTES
[facility] Note    Chief Complaint      Chief Complaint   Patient presents with    Follow-up     6 month f/u       History of Present Illness      Rob Mckeon is a 82 y.o. male with chronic conditions of A fib, HTN, HLD, elevated PSA, insomnia, allergic rhinitis who presents today for: follow up chronic conditions.  A fib: Sees Dr. Cavazos.  On ASA for anticoagulation. Diltiazem for rate control.  Denies chest pain, shortness of breath, palpitations.  HTN: BP at goal on diltiazem.  HLD: Controlled on lipitor.  LDL 46.  Insomnia: Controlled on ambien.  No new complaints.  Elevated PSA: PSA 5.9 last check.  No new or worsening obstructive symptoms.    Allergic rhinitis: Controlled with mucinex as needed and zyrtec daily.   Flu shot UTD.  Prevnar, pneumovax UTD.  COVID vaccine UTD.  Shingrix vaccine UTD.  Cscope Dr. Toscano, unsure when, will get record. Hx of polyp, 5 yr interval.    Past Medical History:  Past Medical History:   Diagnosis Date    Pericarditis        Past Surgical History:   has a past surgical history that includes Cholecystectomy and Hernia repair.    Family History:  family history includes No Known Problems in his father and mother.     Social History:  Social History     Tobacco Use    Smoking status: Former    Smokeless tobacco: Never   Substance Use Topics    Alcohol use: Not Currently       I personally reviewed all past medical, surgical, social and family history.    Review of Systems   Constitutional:  Negative for chills, fever and malaise/fatigue.   Respiratory:  Negative for shortness of breath.    Cardiovascular:  Negative for chest pain.   Gastrointestinal:  Negative for constipation, diarrhea, nausea and vomiting.   Skin:  Negative for rash.   Neurological:  Negative for weakness.   All other systems reviewed and are negative.     Medications:  Outpatient Encounter Medications as of 1/17/2023   Medication Sig Dispense Refill    aspirin (ECOTRIN) 81 MG EC tablet  "Take 81 mg by mouth.      atorvastatin (LIPITOR) 20 MG tablet TAKE 1 TABLET EVERY DAY 90 tablet 3    cetirizine (ZYRTEC) 10 MG tablet Take 10 mg by mouth.      cholecalciferol, vitamin D3, 10 mcg (400 unit) Cap Take by mouth.      cholecalciferol, vitamin D3, 400 unit Cap Take 400 Int'l Units by mouth.      diltiaZEM (CARDIZEM CD) 120 MG Cp24 Take 1 capsule (120 mg total) by mouth once daily. 90 capsule 3    zolpidem (AMBIEN) 10 mg Tab Take 1 tablet (10 mg total) by mouth every evening. 90 tablet 1     No facility-administered encounter medications on file as of 1/17/2023.       Allergies:  Review of patient's allergies indicates:  No Known Allergies    Health Maintenance:  Immunization History   Administered Date(s) Administered    COVID-19, MRNA, LN-S, PF (Pfizer) (Gray Cap) 07/18/2022    COVID-19, MRNA, LN-S, PF (Pfizer) (Purple Cap) 02/10/2021, 03/03/2021, 10/04/2021    Influenza 11/14/2005, 09/16/2009, 09/28/2010, 09/26/2017, 09/14/2018    Influenza (FLUAD) - Quadrivalent - Adjuvanted - PF *Preferred* (65+) 10/02/2020, 11/08/2022    Influenza (FLUAD) - Trivalent - Adjuvanted - PF (65+) 09/26/2017, 09/14/2018, 10/08/2019    Influenza - Quadrivalent - High Dose - PF (65 years and older) 11/09/2021    Influenza - Quadrivalent - PF *Preferred* (6 months and older) 11/14/2005, 09/16/2009, 09/28/2010    Pneumococcal Conjugate - 13 Valent 05/30/2015    Pneumococcal Polysaccharide - 23 Valent 09/14/2018    Zoster 10/02/2014    Zoster Recombinant 11/07/2018, 03/11/2019      Health Maintenance   Topic Date Due    TETANUS VACCINE  Never done    Lipid Panel  07/14/2027        Physical Exam      Vital Signs  Pulse: 67  SpO2: (!) 90 %  BP: 120/60  BP Location: Left arm  Patient Position: Sitting  Pain Score: 0-No pain  Height and Weight  Height: 5' 7" (170.2 cm)  Weight: 72 kg (158 lb 13.5 oz)  BSA (Calculated - sq m): 1.85 sq meters  BMI (Calculated): 24.9  Weight in (lb) to have BMI = 25: 159.3]    Physical Exam  Vitals " reviewed.   Constitutional:       Appearance: He is well-developed.   HENT:      Head: Normocephalic and atraumatic.      Right Ear: External ear normal.      Left Ear: External ear normal.   Cardiovascular:      Rate and Rhythm: Normal rate and regular rhythm.      Heart sounds: Normal heart sounds. No murmur heard.  Pulmonary:      Effort: Pulmonary effort is normal.      Breath sounds: Normal breath sounds. No wheezing or rales.   Abdominal:      General: Bowel sounds are normal.      Palpations: Abdomen is soft.        Laboratory:  CBC:  Recent Labs   Lab 04/29/20  1140 06/07/21  0958   WBC 7.26 7.66   RBC 5.93 5.73   Hemoglobin 17.7 17.1   Hematocrit 55.2 H 53.1   Platelets 264 214   MCV 93 93   MCH 29.8 29.8   MCHC 32.1 32.2     CMP:  Recent Labs   Lab 04/29/20  1140 06/07/21  0958 07/14/22  0757   Glucose 82 103 95   Calcium 10.1 9.8 9.9   Albumin 4.5 4.3 4.0   Total Protein 8.0 7.6 7.4   Sodium 142 142 139   Potassium 6.2 H 5.8 H 4.8   CO2 33 H 34 H 30 H   Chloride 102 102 102   BUN 16 21 H 18   Alkaline Phosphatase 132 H 123 131   ALT 29 22 20   AST 35 34 21   Total Bilirubin 0.8 0.9 1.0     URINALYSIS:       LIPIDS:  Recent Labs   Lab 06/07/21  0954 07/14/22  0757   HDL 38 L 41   Cholesterol 101 L 104 L   Triglycerides 106 83   LDL Cholesterol 41.8 L 46.4 L   HDL/Cholesterol Ratio 37.6 39.4   Non-HDL Cholesterol 63 63   Total Cholesterol/HDL Ratio 2.7 2.5     TSH:      A1C:        Assessment/Plan     Rob Mckeon is a 82 y.o.male with:    1. Essential hypertension  Continue current meds.    2. Hyperlipidemia, mixed  Continue current meds.    3. Paroxysmal atrial fibrillation  Continue current meds.    4. Elevated PSA  Update labs next visit.  Discussed obstructive symptoms  5. Primary insomnia  Continue current meds.    6. Allergic rhinitis, unspecified seasonality, unspecified trigger       Chronic conditions status updated as per HPI.  Other than changes above, cont current medications and maintain  follow up with specialists.  Follow up in about 6 months (around 7/17/2023) for Follow up visit.    Future Appointments   Date Time Provider Department Center   1/17/2023  1:45 PM Rc Cabrera MD [facility]       Rc Cabrera MD  [facility]

## 2023-04-28 ENCOUNTER — OFFICE VISIT (OUTPATIENT)
Dept: PRIMARY CARE CLINIC | Facility: CLINIC | Age: 83
End: 2023-04-28
Payer: MEDICARE

## 2023-04-28 VITALS
HEART RATE: 60 BPM | DIASTOLIC BLOOD PRESSURE: 70 MMHG | SYSTOLIC BLOOD PRESSURE: 130 MMHG | WEIGHT: 157.63 LBS | OXYGEN SATURATION: 91 % | HEIGHT: 67 IN | BODY MASS INDEX: 24.74 KG/M2

## 2023-04-28 DIAGNOSIS — F51.01 PRIMARY INSOMNIA: ICD-10-CM

## 2023-04-28 DIAGNOSIS — R10.31 RIGHT INGUINAL PAIN: Primary | ICD-10-CM

## 2023-04-28 PROCEDURE — 3078F DIAST BP <80 MM HG: CPT | Mod: CPTII,S$GLB,, | Performed by: INTERNAL MEDICINE

## 2023-04-28 PROCEDURE — 1126F AMNT PAIN NOTED NONE PRSNT: CPT | Mod: CPTII,S$GLB,, | Performed by: INTERNAL MEDICINE

## 2023-04-28 PROCEDURE — 99999 PR PBB SHADOW E&M-EST. PATIENT-LVL IV: ICD-10-PCS | Mod: PBBFAC,,, | Performed by: INTERNAL MEDICINE

## 2023-04-28 PROCEDURE — 3075F SYST BP GE 130 - 139MM HG: CPT | Mod: CPTII,S$GLB,, | Performed by: INTERNAL MEDICINE

## 2023-04-28 PROCEDURE — 99214 OFFICE O/P EST MOD 30 MIN: CPT | Mod: S$GLB,,, | Performed by: INTERNAL MEDICINE

## 2023-04-28 PROCEDURE — 1159F MED LIST DOCD IN RCRD: CPT | Mod: CPTII,S$GLB,, | Performed by: INTERNAL MEDICINE

## 2023-04-28 PROCEDURE — 1126F PR PAIN SEVERITY QUANTIFIED, NO PAIN PRESENT: ICD-10-PCS | Mod: CPTII,S$GLB,, | Performed by: INTERNAL MEDICINE

## 2023-04-28 PROCEDURE — 99999 PR PBB SHADOW E&M-EST. PATIENT-LVL IV: CPT | Mod: PBBFAC,,, | Performed by: INTERNAL MEDICINE

## 2023-04-28 PROCEDURE — 3075F PR MOST RECENT SYSTOLIC BLOOD PRESS GE 130-139MM HG: ICD-10-PCS | Mod: CPTII,S$GLB,, | Performed by: INTERNAL MEDICINE

## 2023-04-28 PROCEDURE — 3078F PR MOST RECENT DIASTOLIC BLOOD PRESSURE < 80 MM HG: ICD-10-PCS | Mod: CPTII,S$GLB,, | Performed by: INTERNAL MEDICINE

## 2023-04-28 PROCEDURE — 99214 PR OFFICE/OUTPT VISIT, EST, LEVL IV, 30-39 MIN: ICD-10-PCS | Mod: S$GLB,,, | Performed by: INTERNAL MEDICINE

## 2023-04-28 PROCEDURE — 1159F PR MEDICATION LIST DOCUMENTED IN MEDICAL RECORD: ICD-10-PCS | Mod: CPTII,S$GLB,, | Performed by: INTERNAL MEDICINE

## 2023-04-28 RX ORDER — ZOLPIDEM TARTRATE 10 MG/1
10 TABLET ORAL NIGHTLY
Qty: 90 TABLET | Refills: 2 | Status: SHIPPED | OUTPATIENT
Start: 2023-04-28 | End: 2023-08-02 | Stop reason: SDUPTHER

## 2023-04-28 NOTE — TELEPHONE ENCOUNTER
----- Message from Anushka Curtis sent at 4/28/2023  7:57 AM CDT -----  Regarding: call back  Contact: [phone]  Type:  RX Refill Request    Who Called: PT   Refill or New Rx: Refills   RX Name and Strength: zolpidem (AMBIEN) 10 mg Tab 90 tablet   How is the patient currently taking it? (ex. 1XDay): 1 x a day   Is this a 30 day or 90 day RX: 90  Preferred Pharmacy with phone number: [facility] - [place], [place] - [address] Phone:  [phone] Fax:  [phone]

## 2023-04-28 NOTE — TELEPHONE ENCOUNTER
----- Message from Anushka Curtis sent at 4/28/2023  7:55 AM CDT -----  Regarding: call back  Contact: [phone]  Type:  Same Day Appointment Request    Caller is requesting a same day appointment.  Caller declined first available appointment listed below.    Name of Caller: PT   When is the first available appointment? Next week   Symptoms: right side testicle pain   Best Call Back Number: [phone]  Additional Information:

## 2023-04-28 NOTE — PROGRESS NOTES
[facility] Note    Chief Complaint      Chief Complaint   Patient presents with    Testicle Pain     Right side       History of Present Illness      Rob Mckeon is a 83 y.o. male with chronic conditions of A fib, HTN, HLD, Elevated PSA, Insomnia who presents today for: right inguinal/testicular pain.  Started 1 week ago.  Improved with ice and rest.  Yesterday, stumbled while walking and felt a worsening of the symptoms.  Has never had inguinal hernia issues previously.           Past Medical History:  Past Medical History:   Diagnosis Date    Pericarditis        Past Surgical History:   has a past surgical history that includes Cholecystectomy and Hernia repair.    Family History:  family history includes No Known Problems in his father and mother.     Social History:  Social History     Tobacco Use    Smoking status: Former    Smokeless tobacco: Never   Substance Use Topics    Alcohol use: Not Currently       I personally reviewed all past medical, surgical, social and family history.    Review of Systems   Constitutional:  Negative for chills, fever and malaise/fatigue.   Respiratory:  Negative for shortness of breath.    Cardiovascular:  Negative for chest pain.   Gastrointestinal:  Negative for constipation, diarrhea, nausea and vomiting.   Skin:  Negative for rash.   Neurological:  Negative for weakness.   All other systems reviewed and are negative.     Medications:  Outpatient Encounter Medications as of 4/28/2023   Medication Sig Dispense Refill    aspirin (ECOTRIN) 81 MG EC tablet Take 81 mg by mouth.      atorvastatin (LIPITOR) 20 MG tablet TAKE 1 TABLET EVERY DAY 90 tablet 3    cetirizine (ZYRTEC) 10 MG tablet Take 10 mg by mouth.      cholecalciferol, vitamin D3, 10 mcg (400 unit) Cap Take by mouth.      cholecalciferol, vitamin D3, 400 unit Cap Take 400 Int'l Units by mouth.      diltiaZEM (CARDIZEM CD) 120 MG Cp24 Take 1 capsule (120 mg total) by mouth once daily. 90 capsule 3     "zolpidem (AMBIEN) 10 mg Tab Take 1 tablet (10 mg total) by mouth every evening. 90 tablet 2    [DISCONTINUED] zolpidem (AMBIEN) 10 mg Tab Take 1 tablet (10 mg total) by mouth every evening. 90 tablet 1     No facility-administered encounter medications on file as of 4/28/2023.       Allergies:  Review of patient's allergies indicates:  No Known Allergies    Health Maintenance:  Immunization History   Administered Date(s) Administered    COVID-19, MRNA, LN-S, PF (Pfizer) (Gray Cap) 07/18/2022    COVID-19, MRNA, LN-S, PF (Pfizer) (Purple Cap) 02/10/2021, 03/03/2021, 10/04/2021    Influenza 11/14/2005, 09/16/2009, 09/28/2010, 09/26/2017, 09/14/2018    Influenza (FLUAD) - Quadrivalent - Adjuvanted - PF *Preferred* (65+) 10/02/2020, 11/08/2022    Influenza (FLUAD) - Trivalent - Adjuvanted - PF (65+) 09/26/2017, 09/14/2018, 10/08/2019    Influenza - Quadrivalent - High Dose - PF (65 years and older) 11/09/2021    Influenza - Quadrivalent - PF *Preferred* (6 months and older) 11/14/2005, 09/16/2009, 09/28/2010    Pneumococcal Conjugate - 13 Valent 05/30/2015    Pneumococcal Polysaccharide - 23 Valent 09/14/2018    Zoster 10/02/2014    Zoster Recombinant 11/07/2018, 03/11/2019      Health Maintenance   Topic Date Due    TETANUS VACCINE  Never done    Abdominal Aortic Aneurysm Screening  Never done    Lipid Panel  07/14/2027        Physical Exam      Vital Signs  Pulse: 60  SpO2: (!) 91 %  BP: 130/70  BP Location: Left arm  Patient Position: Sitting  Pain Score: 0-No pain  Height and Weight  Height: 5' 7" (170.2 cm)  Weight: 71.5 kg (157 lb 10.1 oz)  BSA (Calculated - sq m): 1.84 sq meters  BMI (Calculated): 24.7  Weight in (lb) to have BMI = 25: 159.3]    Physical Exam  Vitals reviewed.   Constitutional:       Appearance: He is well-developed.   HENT:      Head: Normocephalic and atraumatic.      Right Ear: External ear normal.      Left Ear: External ear normal.   Cardiovascular:      Rate and Rhythm: Normal rate and " regular rhythm.      Heart sounds: Normal heart sounds. No murmur heard.  Pulmonary:      Effort: Pulmonary effort is normal.      Breath sounds: Normal breath sounds. No wheezing or rales.   Abdominal:      General: Bowel sounds are normal.      Palpations: Abdomen is soft.   Genitourinary:     Comments: No scrotal/testicular abnormality on palpation.  Valsalva induces bulge in right inguinal canal.       Laboratory:  CBC:  Recent Labs   Lab 04/29/20  1140 06/07/21  0958   WBC 7.26 7.66   RBC 5.93 5.73   Hemoglobin 17.7 17.1   Hematocrit 55.2 H 53.1   Platelets 264 214   MCV 93 93   MCH 29.8 29.8   MCHC 32.1 32.2     CMP:  Recent Labs   Lab 04/29/20  1140 06/07/21  0958 07/14/22  0757   Glucose 82 103 95   Calcium 10.1 9.8 9.9   Albumin 4.5 4.3 4.0   Total Protein 8.0 7.6 7.4   Sodium 142 142 139   Potassium 6.2 H 5.8 H 4.8   CO2 33 H 34 H 30 H   Chloride 102 102 102   BUN 16 21 H 18   Alkaline Phosphatase 132 H 123 131   ALT 29 22 20   AST 35 34 21   Total Bilirubin 0.8 0.9 1.0     URINALYSIS:       LIPIDS:  Recent Labs   Lab 06/07/21  0954 07/14/22  0757   HDL 38 L 41   Cholesterol 101 L 104 L   Triglycerides 106 83   LDL Cholesterol 41.8 L 46.4 L   HDL/Cholesterol Ratio 37.6 39.4   Non-HDL Cholesterol 63 63   Total Cholesterol/HDL Ratio 2.7 2.5     TSH:      A1C:        Assessment/Plan     Rob Mckeon is a 83 y.o.male with:    1. Right inguinal pain  - US Scrotum And Testicles; Future  - US Abdomen Limited; Future  Possible mild right inguinal hernia.  Sending for ultrasound to further evaluate.    Chronic conditions status updated as per HPI.  Other than changes above, cont current medications and maintain follow up with specialists.  No follow-ups on file.    Future Appointments   Date Time Provider Department Center   5/5/2023  9:00 AM OC  US1 [facility] ULTRA [place]   5/5/2023 10:00 AM OC US2 OC ULTRA [place]   7/17/2023  1:45 PM Rc Cabrera MD OC PRICRE [place]       cR Cabrera,  MD [facility]

## 2023-05-05 ENCOUNTER — HOSPITAL ENCOUNTER (OUTPATIENT)
Dept: RADIOLOGY | Facility: HOSPITAL | Age: 83
Discharge: HOME | End: 2023-05-05
Attending: INTERNAL MEDICINE
Payer: MEDICARE

## 2023-05-05 DIAGNOSIS — R10.31 RIGHT INGUINAL PAIN: ICD-10-CM

## 2023-05-05 PROCEDURE — 76870 US EXAM SCROTUM: CPT | Mod: 26,,, | Performed by: RADIOLOGY

## 2023-05-05 PROCEDURE — 76705 US ABDOMEN LIMITED: ICD-10-PCS | Mod: 26,,, | Performed by: RADIOLOGY

## 2023-05-05 PROCEDURE — 76870 US SCROTUM AND TESTICLES: ICD-10-PCS | Mod: 26,,, | Performed by: RADIOLOGY

## 2023-05-05 PROCEDURE — 76705 ECHO EXAM OF ABDOMEN: CPT | Mod: TC

## 2023-05-05 PROCEDURE — 76870 US EXAM SCROTUM: CPT | Mod: TC

## 2023-05-05 PROCEDURE — 76705 ECHO EXAM OF ABDOMEN: CPT | Mod: 26,,, | Performed by: RADIOLOGY

## 2023-05-09 ENCOUNTER — TELEPHONE (OUTPATIENT)
Dept: PRIMARY CARE CLINIC | Facility: CLINIC | Age: 83
End: 2023-05-09
Payer: MEDICARE

## 2023-05-09 NOTE — TELEPHONE ENCOUNTER
----- Message from Rc Cabrera MD sent at 5/8/2023  9:59 PM CDT -----  No hernia identified on ultrasound.  No other abnormality identified either.  Symptoms likely due to muscle strain.  Recommend to keep monitoring symptoms and call back if worsens or persists.

## 2023-05-09 NOTE — PROGRESS NOTES
No hernia identified on ultrasound.  No other abnormality identified either.  Symptoms likely due to muscle strain.  Recommend to keep monitoring symptoms and call back if worsens or persists.

## 2023-06-05 ENCOUNTER — PES CALL (OUTPATIENT)
Dept: ADMINISTRATIVE | Facility: CLINIC | Age: 83
End: 2023-06-05
Payer: MEDICARE

## 2023-07-11 ENCOUNTER — TELEPHONE (OUTPATIENT)
Dept: PRIMARY CARE CLINIC | Facility: CLINIC | Age: 83
End: 2023-07-11
Payer: MEDICARE

## 2023-07-11 NOTE — TELEPHONE ENCOUNTER
----- Message from Anushka Curtis sent at 7/11/2023  1:50 PM CDT -----  Regarding: call back  Contact: [phone]  Type:  Sooner Apoointment Request    Caller is requesting a sooner appointment.  Caller declined first available appointment listed below.  Caller will not accept being placed on the waitlist and is requesting a message be sent to doctor.  Name of Caller: PT   When is the first available appointment? 7/17/23  Symptoms: severe Groin Pain   Would the patient rather a call back or a response via [facility]? Call Back   Best Call Back Number: [phone]  Additional Information:

## 2023-07-11 NOTE — TELEPHONE ENCOUNTER
Offered appointment with Dr. Ladd for tomorrow refused stating ONLY wants . Explained he is completely booked. He will keep the 07/17 appt

## 2023-07-12 DIAGNOSIS — E78.2 HYPERLIPIDEMIA, MIXED: ICD-10-CM

## 2023-07-12 RX ORDER — ATORVASTATIN CALCIUM 20 MG/1
TABLET, FILM COATED ORAL
Qty: 90 TABLET | Refills: 3 | Status: SHIPPED | OUTPATIENT
Start: 2023-07-12

## 2023-07-12 NOTE — TELEPHONE ENCOUNTER
Refill Routing Note   Medication(s) are not appropriate for processing by [facility] for the following reason(s):      Required labs outdated    [facility] action(s):  Defer Care Due:  Labs due              Appointments  past 12m or future 3m with PCP    Date Provider   Last Visit   4/28/2023 Rc Cabrera MD   Next Visit   7/17/2023 Rc Cabrera MD   ED visits in past 90 days: 0        Note composed:12:49 PM 07/12/2023

## 2023-07-12 NOTE — TELEPHONE ENCOUNTER
Care Due:                  Date            Visit Type   Department     Provider  --------------------------------------------------------------------------------                                EP -                              PRIMARY  Last Visit: 04-      CARE ([facility])   None Found     Rc Dawson                              EP -                              PRIMARY  Next Visit: 07-      CARE ([facility])   None Found     Rc Dawson                                                            Last  Test          Frequency    Reason                     Performed    Due Date  --------------------------------------------------------------------------------    CMP.........  12 months..  atorvastatin.............  07- 07-    Lipid Panel.  12 months..  atorvastatin.............  07- 07-    Health [facility] Embedded Care Due Messages. Reference number: [id number].   7/12/2023 10:52:55 AM CDT

## 2023-07-17 ENCOUNTER — OFFICE VISIT (OUTPATIENT)
Dept: PRIMARY CARE CLINIC | Facility: CLINIC | Age: 83
End: 2023-07-17
Payer: MEDICARE

## 2023-07-17 VITALS
OXYGEN SATURATION: 91 % | HEART RATE: 70 BPM | HEIGHT: 67 IN | SYSTOLIC BLOOD PRESSURE: 124 MMHG | DIASTOLIC BLOOD PRESSURE: 70 MMHG | BODY MASS INDEX: 24.49 KG/M2 | WEIGHT: 156.06 LBS

## 2023-07-17 DIAGNOSIS — I10 ESSENTIAL HYPERTENSION: ICD-10-CM

## 2023-07-17 DIAGNOSIS — I48.0 PAROXYSMAL ATRIAL FIBRILLATION: Primary | ICD-10-CM

## 2023-07-17 DIAGNOSIS — F51.01 PRIMARY INSOMNIA: ICD-10-CM

## 2023-07-17 DIAGNOSIS — R97.20 ELEVATED PSA: ICD-10-CM

## 2023-07-17 DIAGNOSIS — R10.31 INGUINAL PAIN, RIGHT: ICD-10-CM

## 2023-07-17 DIAGNOSIS — E78.2 HYPERLIPIDEMIA, MIXED: ICD-10-CM

## 2023-07-17 PROCEDURE — 3074F PR MOST RECENT SYSTOLIC BLOOD PRESSURE < 130 MM HG: ICD-10-PCS | Mod: CPTII,S$GLB,, | Performed by: INTERNAL MEDICINE

## 2023-07-17 PROCEDURE — 1159F MED LIST DOCD IN RCRD: CPT | Mod: CPTII,S$GLB,, | Performed by: INTERNAL MEDICINE

## 2023-07-17 PROCEDURE — 3078F DIAST BP <80 MM HG: CPT | Mod: CPTII,S$GLB,, | Performed by: INTERNAL MEDICINE

## 2023-07-17 PROCEDURE — 99999 PR PBB SHADOW E&M-EST. PATIENT-LVL III: CPT | Mod: PBBFAC,,, | Performed by: INTERNAL MEDICINE

## 2023-07-17 PROCEDURE — 99999 PR PBB SHADOW E&M-EST. PATIENT-LVL III: ICD-10-PCS | Mod: PBBFAC,,, | Performed by: INTERNAL MEDICINE

## 2023-07-17 PROCEDURE — 3078F PR MOST RECENT DIASTOLIC BLOOD PRESSURE < 80 MM HG: ICD-10-PCS | Mod: CPTII,S$GLB,, | Performed by: INTERNAL MEDICINE

## 2023-07-17 PROCEDURE — 1126F PR PAIN SEVERITY QUANTIFIED, NO PAIN PRESENT: ICD-10-PCS | Mod: CPTII,S$GLB,, | Performed by: INTERNAL MEDICINE

## 2023-07-17 PROCEDURE — 3074F SYST BP LT 130 MM HG: CPT | Mod: CPTII,S$GLB,, | Performed by: INTERNAL MEDICINE

## 2023-07-17 PROCEDURE — 99214 OFFICE O/P EST MOD 30 MIN: CPT | Mod: S$GLB,,, | Performed by: INTERNAL MEDICINE

## 2023-07-17 PROCEDURE — 99214 PR OFFICE/OUTPT VISIT, EST, LEVL IV, 30-39 MIN: ICD-10-PCS | Mod: S$GLB,,, | Performed by: INTERNAL MEDICINE

## 2023-07-17 PROCEDURE — 1126F AMNT PAIN NOTED NONE PRSNT: CPT | Mod: CPTII,S$GLB,, | Performed by: INTERNAL MEDICINE

## 2023-07-17 PROCEDURE — 1159F PR MEDICATION LIST DOCUMENTED IN MEDICAL RECORD: ICD-10-PCS | Mod: CPTII,S$GLB,, | Performed by: INTERNAL MEDICINE

## 2023-07-17 NOTE — PROGRESS NOTES
[facility] Note    Chief Complaint      Chief Complaint   Patient presents with    Follow-up     6 month        History of Present Illness      Rob Mckeon is a 83 y.o. male with chronic conditions of A fib, HTN, HLD, elevated PSA, insomnia, allergic rhinitis who presents today for: follow up chronic conditions.  Seeing binta Baldwin.  Still with right inguinal swelling and occasional discomfort which started initially after a strong cough.  Had ultrasound to evaluate 2 months ago which did not reveal a hernia or other possible causes.    A fib: Sees Dr. Cavazos.  On ASA for anticoagulation. Diltiazem for rate control.  Denies chest pain, shortness of breath, palpitations.  HTN: BP at goal on diltiazem.  HLD: Controlled on lipitor.  LDL 46.  Insomnia: Controlled on ambien.  No new complaints.  Elevated PSA: PSA 5.9 last check.  No new or worsening obstructive symptoms.    Allergic rhinitis: Controlled with mucinex as needed and zyrtec daily.   Flu shot UTD.  Prevnar, pneumovax UTD.  COVID vaccine UTD.  Shingrix vaccine UTD.  Cscope Dr. Toscano, unsure when, will get record. Hx of polyp, 5 yr interval.       Past Medical History:  Past Medical History:   Diagnosis Date    Pericarditis        Past Surgical History:   has a past surgical history that includes Cholecystectomy and Hernia repair.    Family History:  family history includes No Known Problems in his father and mother.     Social History:  Social History     Tobacco Use    Smoking status: Former    Smokeless tobacco: Never   Substance Use Topics    Alcohol use: Not Currently       I personally reviewed all past medical, surgical, social and family history.    Review of Systems   Constitutional:  Negative for chills, fever and malaise/fatigue.   Respiratory:  Negative for shortness of breath.    Cardiovascular:  Negative for chest pain.   Gastrointestinal:  Negative for constipation, diarrhea, nausea and vomiting.   Skin:  Negative for  rash.   Neurological:  Negative for weakness.   All other systems reviewed and are negative.     Medications:  Outpatient Encounter Medications as of 7/17/2023   Medication Sig Dispense Refill    aspirin (ECOTRIN) 81 MG EC tablet Take 81 mg by mouth.      atorvastatin (LIPITOR) 20 MG tablet TAKE 1 TABLET EVERY DAY 90 tablet 3    cetirizine (ZYRTEC) 10 MG tablet Take 10 mg by mouth.      cholecalciferol, vitamin D3, 10 mcg (400 unit) Cap Take by mouth.      cholecalciferol, vitamin D3, 400 unit Cap Take 400 Int'l Units by mouth.      diltiaZEM (CARDIZEM CD) 120 MG Cp24 Take 1 capsule (120 mg total) by mouth once daily. 90 capsule 3    zolpidem (AMBIEN) 10 mg Tab Take 1 tablet (10 mg total) by mouth every evening. 90 tablet 2    [DISCONTINUED] atorvastatin (LIPITOR) 20 MG tablet TAKE 1 TABLET EVERY DAY 90 tablet 3     No facility-administered encounter medications on file as of 7/17/2023.       Allergies:  Review of patient's allergies indicates:  No Known Allergies    Health Maintenance:  Immunization History   Administered Date(s) Administered    COVID-19, MRNA, LN-S, PF (Pfizer) (Gray Cap) 07/18/2022    COVID-19, MRNA, LN-S, PF (Pfizer) (Purple Cap) 02/10/2021, 03/03/2021, 10/04/2021    Influenza 11/14/2005, 09/16/2009, 09/28/2010, 09/26/2017, 09/14/2018    Influenza (FLUAD) - Quadrivalent - Adjuvanted - PF *Preferred* (65+) 10/02/2020, 11/08/2022    Influenza (FLUAD) - Trivalent - Adjuvanted - PF (65+) 09/26/2017, 09/14/2018, 10/08/2019    Influenza - Quadrivalent - High Dose - PF (65 years and older) 11/09/2021    Influenza - Quadrivalent - PF *Preferred* (6 months and older) 11/14/2005, 09/16/2009, 09/28/2010    Pneumococcal Conjugate - 13 Valent 05/30/2015    Pneumococcal Polysaccharide - 23 Valent 09/14/2018    Zoster 10/02/2014    Zoster Recombinant 11/07/2018, 03/11/2019      Health Maintenance   Topic Date Due    TETANUS VACCINE  Never done    Lipid Panel  07/14/2027        Physical Exam      Vital  "Signs  Pulse: 70  SpO2: (!) 91 %  BP: 124/70  BP Location: Right arm  Patient Position: Sitting  Pain Score: 0-No pain  Height and Weight  Height: 5' 7" (170.2 cm)  Weight: 70.8 kg (156 lb 1.4 oz)  BSA (Calculated - sq m): 1.83 sq meters  BMI (Calculated): 24.4  Weight in (lb) to have BMI = 25: 159.3]    Physical Exam  Vitals reviewed.   Constitutional:       Appearance: He is well-developed.   HENT:      Head: Normocephalic and atraumatic.      Right Ear: External ear normal.      Left Ear: External ear normal.   Cardiovascular:      Rate and Rhythm: Normal rate and regular rhythm.      Heart sounds: Normal heart sounds. No murmur heard.  Pulmonary:      Effort: Pulmonary effort is normal.      Breath sounds: Normal breath sounds. No wheezing or rales.   Abdominal:      General: Bowel sounds are normal.      Palpations: Abdomen is soft.        Laboratory:  CBC:  Recent Labs   Lab 06/07/21  0958   WBC 7.66   RBC 5.73   Hemoglobin 17.1   Hematocrit 53.1   Platelets 214   MCV 93   MCH 29.8   MCHC 32.2     CMP:  Recent Labs   Lab 06/07/21  0958 07/14/22  0757   Glucose 103 95   Calcium 9.8 9.9   Albumin 4.3 4.0   Total Protein 7.6 7.4   Sodium 142 139   Potassium 5.8 H 4.8   CO2 34 H 30 H   Chloride 102 102   BUN 21 H 18   Alkaline Phosphatase 123 131   ALT 22 20   AST 34 21   Total Bilirubin 0.9 1.0     URINALYSIS:       LIPIDS:  Recent Labs   Lab 06/07/21  0954 07/14/22  0757   HDL 38 L 41   Cholesterol 101 L 104 L   Triglycerides 106 83   LDL Cholesterol 41.8 L 46.4 L   HDL/Cholesterol Ratio 37.6 39.4   Non-HDL Cholesterol 63 63   Total Cholesterol/HDL Ratio 2.7 2.5     TSH:      A1C:        Assessment/Plan     Rob Mckeon is a 83 y.o.male with:    1. Paroxysmal atrial fibrillation  Continue current meds.  F/U with cardiology.  2. Essential hypertension  - Comprehensive Metabolic Panel; Future  Continue current meds.    3. Hyperlipidemia, mixed  - Comprehensive Metabolic Panel; Future  - Lipid Panel; " Future  Continue current meds.    4. Primary insomnia  Continue current meds.    5. Elevated PSA  - PSA, TOTAL AND FREE; Future  Update labs.  6. Inguinal pain, right  - Ambulatory referral/consult to Urology; Future  Refer to urology to get second opinion.      Chronic conditions status updated as per HPI.  Other than changes above, cont current medications and maintain follow up with specialists.  No follow-ups on file.    Future Appointments   Date Time Provider Department Center   1/17/2024  2:15 PM Rc Cabrera MD [facility]       Rc Cabrera MD  [facility]

## 2023-07-18 ENCOUNTER — LAB VISIT (OUTPATIENT)
Dept: LAB | Facility: HOSPITAL | Age: 83
End: 2023-07-18
Attending: INTERNAL MEDICINE
Payer: MEDICARE

## 2023-07-18 DIAGNOSIS — E78.2 HYPERLIPIDEMIA, MIXED: ICD-10-CM

## 2023-07-18 DIAGNOSIS — I10 ESSENTIAL HYPERTENSION: ICD-10-CM

## 2023-07-18 DIAGNOSIS — R97.20 ELEVATED PSA: ICD-10-CM

## 2023-07-18 LAB
ALBUMIN SERPL BCP-MCNC: 4 G/DL (ref 3.5–5.2)
ALP SERPL-CCNC: 112 U/L (ref 55–135)
ALT SERPL W/O P-5'-P-CCNC: 20 U/L (ref 10–44)
ANION GAP SERPL CALC-SCNC: 9 MMOL/L (ref 8–16)
AST SERPL-CCNC: 24 U/L (ref 10–40)
BILIRUB SERPL-MCNC: 1.1 MG/DL (ref 0.1–1)
BUN SERPL-MCNC: 17 MG/DL (ref 8–23)
CALCIUM SERPL-MCNC: 10 MG/DL (ref 8.7–10.5)
CHLORIDE SERPL-SCNC: 103 MMOL/L (ref 95–110)
CHOLEST SERPL-MCNC: 107 MG/DL (ref 120–199)
CHOLEST/HDLC SERPL: 2.3 {RATIO} (ref 2–5)
CO2 SERPL-SCNC: 29 MMOL/L (ref 23–29)
CREAT SERPL-MCNC: 1 MG/DL (ref 0.5–1.4)
EST. GFR  (NO RACE VARIABLE): >60 ML/MIN/1.73 M^2
GLUCOSE SERPL-MCNC: 95 MG/DL (ref 70–110)
HDLC SERPL-MCNC: 46 MG/DL (ref 40–75)
HDLC SERPL: 43 % (ref 20–50)
LDLC SERPL CALC-MCNC: 47.2 MG/DL (ref 63–159)
NONHDLC SERPL-MCNC: 61 MG/DL
POTASSIUM SERPL-SCNC: 5.2 MMOL/L (ref 3.5–5.1)
PROSTATE SPECIFIC ANTIGEN, TOTAL: 4 NG/ML (ref 0–4)
PROT SERPL-MCNC: 7 G/DL (ref 6–8.4)
PSA FREE MFR SERPL: 44.75 %
PSA FREE SERPL-MCNC: 1.79 NG/ML (ref 0–1.5)
SODIUM SERPL-SCNC: 141 MMOL/L (ref 136–145)
TRIGL SERPL-MCNC: 69 MG/DL (ref 30–150)

## 2023-07-18 PROCEDURE — 80053 COMPREHEN METABOLIC PANEL: CPT | Mod: HCNC | Performed by: INTERNAL MEDICINE

## 2023-07-18 PROCEDURE — 36415 COLL VENOUS BLD VENIPUNCTURE: CPT | Performed by: INTERNAL MEDICINE

## 2023-07-18 PROCEDURE — 80061 LIPID PANEL: CPT | Mod: HCNC | Performed by: INTERNAL MEDICINE

## 2023-07-18 PROCEDURE — 84153 ASSAY OF PSA TOTAL: CPT | Performed by: INTERNAL MEDICINE

## 2023-07-21 ENCOUNTER — OFFICE VISIT (OUTPATIENT)
Dept: UROLOGY | Facility: CLINIC | Age: 83
End: 2023-07-21
Payer: MEDICARE

## 2023-07-21 ENCOUNTER — TELEPHONE (OUTPATIENT)
Dept: SURGERY | Facility: CLINIC | Age: 83
End: 2023-07-21
Payer: MEDICARE

## 2023-07-21 VITALS
HEART RATE: 66 BPM | SYSTOLIC BLOOD PRESSURE: 159 MMHG | WEIGHT: 154.31 LBS | BODY MASS INDEX: 24.22 KG/M2 | HEIGHT: 67 IN | DIASTOLIC BLOOD PRESSURE: 84 MMHG

## 2023-07-21 DIAGNOSIS — R10.31 INGUINAL PAIN, RIGHT: ICD-10-CM

## 2023-07-21 PROCEDURE — 99204 PR OFFICE/OUTPT VISIT, NEW, LEVL IV, 45-59 MIN: ICD-10-PCS | Mod: HCNC,S$GLB,, | Performed by: UROLOGY

## 2023-07-21 PROCEDURE — 3288F PR FALLS RISK ASSESSMENT DOCUMENTED: ICD-10-PCS | Mod: HCNC,CPTII,S$GLB, | Performed by: UROLOGY

## 2023-07-21 PROCEDURE — 1126F PR PAIN SEVERITY QUANTIFIED, NO PAIN PRESENT: ICD-10-PCS | Mod: HCNC,CPTII,S$GLB, | Performed by: UROLOGY

## 2023-07-21 PROCEDURE — 3077F SYST BP >= 140 MM HG: CPT | Mod: HCNC,CPTII,S$GLB, | Performed by: UROLOGY

## 2023-07-21 PROCEDURE — 99999 PR PBB SHADOW E&M-EST. PATIENT-LVL III: CPT | Mod: PBBFAC,HCNC,, | Performed by: UROLOGY

## 2023-07-21 PROCEDURE — 99999 PR PBB SHADOW E&M-EST. PATIENT-LVL III: ICD-10-PCS | Mod: PBBFAC,HCNC,, | Performed by: UROLOGY

## 2023-07-21 PROCEDURE — 3077F PR MOST RECENT SYSTOLIC BLOOD PRESSURE >= 140 MM HG: ICD-10-PCS | Mod: HCNC,CPTII,S$GLB, | Performed by: UROLOGY

## 2023-07-21 PROCEDURE — 1126F AMNT PAIN NOTED NONE PRSNT: CPT | Mod: HCNC,CPTII,S$GLB, | Performed by: UROLOGY

## 2023-07-21 PROCEDURE — 1101F PT FALLS ASSESS-DOCD LE1/YR: CPT | Mod: HCNC,CPTII,S$GLB, | Performed by: UROLOGY

## 2023-07-21 PROCEDURE — 99204 OFFICE O/P NEW MOD 45 MIN: CPT | Mod: HCNC,S$GLB,, | Performed by: UROLOGY

## 2023-07-21 PROCEDURE — 1159F MED LIST DOCD IN RCRD: CPT | Mod: HCNC,CPTII,S$GLB, | Performed by: UROLOGY

## 2023-07-21 PROCEDURE — 3288F FALL RISK ASSESSMENT DOCD: CPT | Mod: HCNC,CPTII,S$GLB, | Performed by: UROLOGY

## 2023-07-21 PROCEDURE — 3079F DIAST BP 80-89 MM HG: CPT | Mod: HCNC,CPTII,S$GLB, | Performed by: UROLOGY

## 2023-07-21 PROCEDURE — 1159F PR MEDICATION LIST DOCUMENTED IN MEDICAL RECORD: ICD-10-PCS | Mod: HCNC,CPTII,S$GLB, | Performed by: UROLOGY

## 2023-07-21 PROCEDURE — 1101F PR PT FALLS ASSESS DOC 0-1 FALLS W/OUT INJ PAST YR: ICD-10-PCS | Mod: HCNC,CPTII,S$GLB, | Performed by: UROLOGY

## 2023-07-21 PROCEDURE — 3079F PR MOST RECENT DIASTOLIC BLOOD PRESSURE 80-89 MM HG: ICD-10-PCS | Mod: HCNC,CPTII,S$GLB, | Performed by: UROLOGY

## 2023-07-21 NOTE — PROGRESS NOTES
CC: right inguinal swelling and pain    Rob Mckeon is a 83 y.o. man who is here for the evaluation of Groin Pain and Testicle Pain    A new pt referred by his PCP, Rc Cabrera MD   C/o right inguinal swelling and pain for the past 2 months.  He coughed hard and felt something in the right inguinal area.  He immediately noticed swelling of the inguinal are and the pain over the area as well as the right testicle.  Subsequently, the swelling went away and his pain got better without any intervention.  Was seen by his PCP and got scrotal US in May, 2023.    C/o right inguinal swelling that is fluctuating depending on his activities.  Typically he notices more swelling when he is up and around at the end of the day.  He does not notice a significant swelling early in the morning.    Voices no problem with urination.  No change in urination noted.    Past Medical History:   Diagnosis Date    Pericarditis      Past Surgical History:   Procedure Laterality Date    CHOLECYSTECTOMY      HERNIA REPAIR       Social History     Tobacco Use    Smoking status: Former    Smokeless tobacco: Never   Substance Use Topics    Alcohol use: Not Currently     Family History   Problem Relation Age of Onset    No Known Problems Mother     No Known Problems Father      Allergy:  Review of patient's allergies indicates:  No Known Allergies  Outpatient Encounter Medications as of 7/21/2023   Medication Sig Dispense Refill    aspirin (ECOTRIN) 81 MG EC tablet Take 81 mg by mouth.      atorvastatin (LIPITOR) 20 MG tablet TAKE 1 TABLET EVERY DAY 90 tablet 3    cetirizine (ZYRTEC) 10 MG tablet Take 10 mg by mouth.      cholecalciferol, vitamin D3, 10 mcg (400 unit) Cap Take by mouth.      cholecalciferol, vitamin D3, 400 unit Cap Take 400 Int'l Units by mouth.      diltiaZEM (CARDIZEM CD) 120 MG Cp24 Take 1 capsule (120 mg total) by mouth once daily. 90 capsule 3    zolpidem (AMBIEN) 10 mg Tab Take 1 tablet (10 mg total) by mouth every  evening. 90 tablet 2     No facility-administered encounter medications on file as of 7/21/2023.     Review of Systems   ROS  Physical Exam     Vitals:    07/21/23 1032   BP: (!) 159/84   Pulse: 66     Physical Exam  Constitutional:       General: He is not in acute distress.     Appearance: He is well-developed. He is not diaphoretic.   HENT:      Head: Normocephalic and atraumatic.      Right Ear: External ear normal.      Left Ear: External ear normal.      Nose: Nose normal.   Eyes:      Conjunctiva/sclera: Conjunctivae normal.      Pupils: Pupils are equal, round, and reactive to light.   Neck:      Thyroid: No thyromegaly.      Vascular: No JVD.      Trachea: No tracheal deviation.   Cardiovascular:      Rate and Rhythm: Normal rate and regular rhythm.      Heart sounds: Normal heart sounds. No murmur heard.    No friction rub. No gallop.   Pulmonary:      Effort: Pulmonary effort is normal. No respiratory distress.      Breath sounds: Normal breath sounds. No wheezing.   Chest:      Chest wall: No tenderness.   Abdominal:      General: Bowel sounds are normal. There is no distension.      Palpations: Abdomen is soft. There is no mass.      Tenderness: There is no abdominal tenderness. There is no guarding or rebound.      Hernia: A hernia is present.      Comments: Right inguinal hernia ( small) noted.   Genitourinary:     Penis: Normal. No tenderness.    Musculoskeletal:         General: No tenderness or deformity. Normal range of motion.      Cervical back: Normal range of motion and neck supple.   Lymphadenopathy:      Cervical: No cervical adenopathy.   Skin:     General: Skin is warm and dry.   Neurological:      Mental Status: He is alert and oriented to person, place, and time.   Psychiatric:         Behavior: Behavior normal.         Thought Content: Thought content normal.     Genitalia:  Scrotum: no rash or lesion  Normal symmetric epididymis without masses  Normal vas palpated  Normal size,  symmetric testicles with no masses   Normal urethral meatus with no discharge  Normal circumcised penis with no lesion         LABS:  Lab Results   Component Value Date    PSATOTAL 4.0 07/18/2023    PSATOTAL 5.9 (H) 07/14/2022    PSATOTAL 4.6 (H) 06/07/2021    PSATOTAL 5.0 (H) 04/29/2020    PSAFREE 1.79 (H) 07/18/2023    PSAFREE 2.83 (H) 07/14/2022    PSAFREE 2.06 (H) 06/07/2021    PSAFREE 2.76 (H) 04/29/2020    PSAFREEPCT 44.75 07/18/2023    PSAFREEPCT 47.97 07/14/2022    PSAFREEPCT 44.78 06/07/2021    PSAFREEPCT 55.20 04/29/2020     No results found for this or any previous visit.  Lab Results   Component Value Date    CREATININE 1.0 07/18/2023    CREATININE 1.2 07/14/2022    CREATININE 0.99 06/07/2021     No results found for this or any previous visit.  No results found for: LABURIN  No results found for: HGBA1C    Radiology:  US 5/5/23  Normal ultrasound of the testicles.     Prominent vasculature left groin.    Assessment and Plan:  Rob ROY was seen today for groin pain and testicle pain.    Diagnoses and all orders for this visit:    Inguinal pain, right  -     Ambulatory referral/consult to Urology  -     Ambulatory referral/consult to General Surgery; Future    No urologic abnormality noted related his symptoms.  I suspect all his symptoms are related to right inguinal hernia.  Refer to general surgery for evaluation and management.    No more PSA check is needed on this pt.  Follow-up:  Follow up general surgery.

## 2023-07-24 ENCOUNTER — TELEPHONE (OUTPATIENT)
Dept: PRIMARY CARE CLINIC | Facility: CLINIC | Age: 83
End: 2023-07-24
Payer: MEDICARE

## 2023-07-24 NOTE — TELEPHONE ENCOUNTER
----- Message from Tino Ramirez sent at 7/24/2023  9:06 AM CDT -----  Contact: pt wife  Type: Requesting to speak with nurse        Who Called: PT's wife   Regarding: xray   Would the patient rather a call back or a response via [facility]? Call back  Best Call Back Number: [phone]  Additional Information:

## 2023-07-28 ENCOUNTER — TELEPHONE (OUTPATIENT)
Dept: SURGERY | Facility: CLINIC | Age: 83
End: 2023-07-28
Payer: MEDICARE

## 2023-08-02 DIAGNOSIS — F51.01 PRIMARY INSOMNIA: ICD-10-CM

## 2023-08-02 RX ORDER — ZOLPIDEM TARTRATE 10 MG/1
10 TABLET ORAL NIGHTLY
Qty: 90 TABLET | Refills: 2 | Status: SHIPPED | OUTPATIENT
Start: 2023-08-02

## 2023-08-02 NOTE — TELEPHONE ENCOUNTER
----- Message from Emma Valderrama sent at 8/2/2023 11:24 AM CDT -----  Type:  RX Refill Request    Who Called: pt  Refill or New Rx:refill  RX Name and Strength:zolpidem (AMBIEN) 10 mg Tab  Preferred Pharmacy with phone number:[facility] [address]  Local or Mail Order:local  Ordering Provider:ashok  Would the patient rather a call back or a response via [facility]? call  Best Call Back Number:[phone]  Additional Information:

## 2023-08-14 RX ORDER — DILTIAZEM HYDROCHLORIDE 120 MG/1
120 CAPSULE, COATED, EXTENDED RELEASE ORAL
Qty: 90 CAPSULE | Refills: 3 | Status: SHIPPED | OUTPATIENT
Start: 2023-08-14

## 2023-08-14 NOTE — TELEPHONE ENCOUNTER
No care due was identified.  Health [facility] Embedded Care Due Messages. Reference number: [id number].   8/14/2023 10:30:17 AM CDT

## 2023-08-14 NOTE — TELEPHONE ENCOUNTER
Refill Routing Note   Medication(s) are not appropriate for processing by [facility] for the following reason(s):      Required vitals abnormal    [facility] action(s):  Defer Care Due:  None identified            Appointments  past 12m or future 3m with PCP    Date Provider   Last Visit   7/17/2023 Rc Cabrera MD   Next Visit   1/17/2024 Rc Cabrera MD   ED visits in past 90 days: 0        Note composed:10:42 AM 08/14/2023

## 2023-10-25 ENCOUNTER — TELEPHONE (OUTPATIENT)
Dept: PRIMARY CARE CLINIC | Facility: CLINIC | Age: 83
End: 2023-10-25
Payer: MEDICARE

## 2023-10-25 DIAGNOSIS — R25.3 MUSCLE TWITCHING: Primary | ICD-10-CM

## 2023-10-25 NOTE — TELEPHONE ENCOUNTER
----- Message from Nicolle العراقي sent at 10/25/2023  2:53 PM CDT -----  .Type:  Needs Medical Advice    Who Called: pt wife Rahel Wise    Would the patient rather a call back or a response via [facility]? Call back  Best Call Back Number:   Additional Information:     Pt wife stated pt eye doctor stated pt need a referral for a neurologist please call pt wife back

## 2023-10-25 NOTE — TELEPHONE ENCOUNTER
Has pt tried anything OTC for this yet?  I'd recommend supplementing magnesium glycinate or magnesium gluconate or magnesium oxide 400-500 mg daily.  Referral placed to neurology.

## 2023-10-25 NOTE — TELEPHONE ENCOUNTER
Spoke to wife, saw retina specialist and told them that he had twitching in the right cheek for about 1 month. Retina dr told him to contact PCP and get referred to neurology for facial twitching

## 2024-01-17 ENCOUNTER — OFFICE VISIT (OUTPATIENT)
Dept: PRIMARY CARE CLINIC | Facility: CLINIC | Age: 84
End: 2024-01-17
Payer: MEDICARE

## 2024-01-17 VITALS
DIASTOLIC BLOOD PRESSURE: 70 MMHG | OXYGEN SATURATION: 90 % | SYSTOLIC BLOOD PRESSURE: 130 MMHG | WEIGHT: 154.56 LBS | BODY MASS INDEX: 24.26 KG/M2 | HEART RATE: 70 BPM | HEIGHT: 67 IN

## 2024-01-17 DIAGNOSIS — F51.01 PRIMARY INSOMNIA: ICD-10-CM

## 2024-01-17 DIAGNOSIS — J30.9 ALLERGIC RHINITIS, UNSPECIFIED SEASONALITY, UNSPECIFIED TRIGGER: ICD-10-CM

## 2024-01-17 DIAGNOSIS — I10 ESSENTIAL HYPERTENSION: ICD-10-CM

## 2024-01-17 DIAGNOSIS — E78.2 HYPERLIPIDEMIA, MIXED: ICD-10-CM

## 2024-01-17 DIAGNOSIS — I48.0 PAROXYSMAL ATRIAL FIBRILLATION: Primary | ICD-10-CM

## 2024-01-17 DIAGNOSIS — H35.3221 EXUDATIVE AGE-RELATED MACULAR DEGENERATION, LEFT EYE, WITH ACTIVE CHOROIDAL NEOVASCULARIZATION: ICD-10-CM

## 2024-01-17 DIAGNOSIS — R97.20 ELEVATED PSA: ICD-10-CM

## 2024-01-17 PROCEDURE — 1126F AMNT PAIN NOTED NONE PRSNT: CPT | Mod: CPTII,S$GLB,, | Performed by: INTERNAL MEDICINE

## 2024-01-17 PROCEDURE — 99999 PR PBB SHADOW E&M-EST. PATIENT-LVL III: CPT | Mod: PBBFAC,,, | Performed by: INTERNAL MEDICINE

## 2024-01-17 PROCEDURE — 99214 OFFICE O/P EST MOD 30 MIN: CPT | Mod: S$GLB,,, | Performed by: INTERNAL MEDICINE

## 2024-01-17 PROCEDURE — 3075F SYST BP GE 130 - 139MM HG: CPT | Mod: CPTII,S$GLB,, | Performed by: INTERNAL MEDICINE

## 2024-01-17 PROCEDURE — 3078F DIAST BP <80 MM HG: CPT | Mod: CPTII,S$GLB,, | Performed by: INTERNAL MEDICINE

## 2024-01-17 PROCEDURE — 1159F MED LIST DOCD IN RCRD: CPT | Mod: CPTII,S$GLB,, | Performed by: INTERNAL MEDICINE

## 2024-01-17 RX ORDER — FLUTICASONE PROPIONATE 50 MCG
1 SPRAY, SUSPENSION (ML) NASAL 2 TIMES DAILY
Qty: 16 G | Refills: 3 | Status: SHIPPED | OUTPATIENT
Start: 2024-01-17

## 2024-01-17 NOTE — PROGRESS NOTES
[facility] Note    Chief Complaint      Chief Complaint   Patient presents with    Follow-up     6 month        History of Present Illness      Rob Mckeon is a 83 y.o. male with chronic conditions of A fib, HTN, HLD, elevated PSA, insomnia, allergic rhinitis  who presents today for: follow up chronic conditions.  Had COVID 1/2, seen at urgent care, treated with paxlovid.  A fib: Sees Dr. Cavazos.  On ASA for anticoagulation. Diltiazem for rate control.  Denies chest pain, shortness of breath, palpitations.  HTN: BP at goal on diltiazem.  HLD: Controlled on lipitor.  LDL 46.  Insomnia: Controlled on ambien.  No new complaints.  Elevated PSA: PSA 4.0 last check.  No new or worsening obstructive symptoms.    Allergic rhinitis: Controlled with mucinex as needed and zyrtec daily.   Flu shot UTD.  Prevnar, pneumovax UTD.  COVID vaccine UTD.  Shingrix vaccine UTD.  Cscope Dr. Toscano, unsure when, will get record. Hx of polyp, 5 yr interval.     Past Medical History:  Past Medical History:   Diagnosis Date    Pericarditis        Past Surgical History:   has a past surgical history that includes Cholecystectomy and Hernia repair.    Family History:  family history includes No Known Problems in his father and mother.     Social History:  Social History     Tobacco Use    Smoking status: Former    Smokeless tobacco: Never   Substance Use Topics    Alcohol use: Not Currently       I personally reviewed all past medical, surgical, social and family history.    Review of Systems   Constitutional:  Negative for chills, fever and malaise/fatigue.   Respiratory:  Negative for shortness of breath.    Cardiovascular:  Negative for chest pain.   Gastrointestinal:  Negative for constipation, diarrhea, nausea and vomiting.   Skin:  Negative for rash.   Neurological:  Negative for weakness.   All other systems reviewed and are negative.       Medications:  Outpatient Encounter Medications as of 1/17/2024   Medication  Sig Dispense Refill    aspirin (ECOTRIN) 81 MG EC tablet Take 81 mg by mouth.      atorvastatin (LIPITOR) 20 MG tablet TAKE 1 TABLET EVERY DAY 90 tablet 3    cetirizine (ZYRTEC) 10 MG tablet Take 10 mg by mouth.      cholecalciferol, vitamin D3, 10 mcg (400 unit) Cap Take by mouth.      cholecalciferol, vitamin D3, 400 unit Cap Take 400 Int'l Units by mouth.      diltiaZEM (CARDIZEM CD) 120 MG Cp24 TAKE 1 CAPSULE EVERY DAY 90 capsule 3    fluticasone propionate (FLONASE) 50 mcg/actuation nasal spray 1 spray (50 mcg total) by Each Nostril route 2 (two) times a day. 16 g 3    INV zimura 20 mg/mL injection 2 mg by Intravitreal route every 30 days. FOR INVESTIGATIONAL USE ONLY.      zolpidem (AMBIEN) 10 mg Tab Take 1 tablet (10 mg total) by mouth every evening. 90 tablet 2     No facility-administered encounter medications on file as of 1/17/2024.       Allergies:  Review of patient's allergies indicates:  No Known Allergies    Health Maintenance:  Immunization History   Administered Date(s) Administered    COVID-19, MRNA, LN-S, PF (Pfizer) (Gray Cap) 07/18/2022    COVID-19, MRNA, LN-S, PF (Pfizer) (Purple Cap) 02/10/2021, 03/03/2021, 10/04/2021    Influenza 11/14/2005, 09/16/2009, 09/28/2010, 09/26/2017, 09/14/2018    Influenza (FLUAD) - Quadrivalent - Adjuvanted - PF *Preferred* (65+) 10/02/2020, 11/08/2022, 11/08/2023    Influenza (FLUAD) - Trivalent - Adjuvanted - PF (65+) 09/26/2017, 09/14/2018, 10/08/2019    Influenza - Quadrivalent - High Dose - PF (65 years and older) 11/09/2021    Influenza - Quadrivalent - PF *Preferred* (6 months and older) 11/14/2005, 09/16/2009, 09/28/2010    Pneumococcal Conjugate - 13 Valent 05/30/2015    Pneumococcal Polysaccharide - 23 Valent 09/14/2018    Zoster 10/02/2014    Zoster Recombinant 11/07/2018, 03/11/2019      Health Maintenance   Topic Date Due    TETANUS VACCINE  Never done    Lipid Panel  07/18/2028    Shingles Vaccine  Completed        Physical Exam      Vital  "Signs  Pulse: 70  SpO2: (!) 90 %  BP: 130/70  BP Location: Left arm  Patient Position: Sitting  Pain Score: 0-No pain  Height and Weight  Height: 5' 7" (170.2 cm)  Weight: 70.1 kg (154 lb 8.7 oz)  BSA (Calculated - sq m): 1.82 sq meters  BMI (Calculated): 24.2  Weight in (lb) to have BMI = 25: 159.3]    Physical Exam  Vitals reviewed.   Constitutional:       Appearance: He is well-developed.   HENT:      Head: Normocephalic and atraumatic.      Right Ear: External ear normal.      Left Ear: External ear normal.   Cardiovascular:      Rate and Rhythm: Normal rate and regular rhythm.      Heart sounds: Normal heart sounds. No murmur heard.  Pulmonary:      Effort: Pulmonary effort is normal.      Breath sounds: Normal breath sounds. No wheezing or rales.   Abdominal:      General: Bowel sounds are normal.      Palpations: Abdomen is soft.          Laboratory:  CBC:  Recent Labs   Lab 06/07/21  0958 09/05/23  1035   WBC 7.66 7.0   RBC 5.73  --    Hemoglobin 17.1 16.5   Hematocrit 53.1 48.8   Platelets 214  --    MCV 93 87.7   MCH 29.8 29.6   MCHC 32.2 33.7     CMP:  Recent Labs   Lab 06/07/21  0958 07/14/22  0757 07/18/23  0851   Glucose 103 95 95   Calcium 9.8 9.9 10.0   Albumin 4.3 4.0 4.0   Total Protein 7.6 7.4 7.0   Sodium 142 139 141   Potassium 5.8 H 4.8 5.2 H   CO2 34 H 30 H 29   Chloride 102 102 103   BUN 21 H 18 17   Alkaline Phosphatase 123 131 112   ALT 22 20 20   AST 34 21 24   Total Bilirubin 0.9 1.0 1.1 H     URINALYSIS:       LIPIDS:  Recent Labs   Lab 06/07/21  0954 07/14/22  0757 07/18/23  0851   HDL 38 L 41 46   Cholesterol 101 L 104 L 107 L   Triglycerides 106 83 69   LDL Cholesterol 41.8 L 46.4 L 47.2 L   HDL/Cholesterol Ratio 37.6 39.4 43.0   Non-HDL Cholesterol 63 63 61   Total Cholesterol/HDL Ratio 2.7 2.5 2.3     TSH:      A1C:        Assessment/Plan     Rob J Raziano is a 83 y.o.male with:    1. Paroxysmal atrial fibrillation  Continue current meds.  F/U with cardiology.   2. Essential " hypertension  Continue current meds.    3. Hyperlipidemia, mixed  - Comprehensive Metabolic Panel; Future  - Lipid Panel; Future  Continue current meds.    4. Primary insomnia  Continue current meds.    5. Elevated PSA  - PSA, TOTAL AND FREE; Future  Cont to monitor  6. Allergic rhinitis, unspecified seasonality, unspecified trigger    7. Exudative age-related macular degeneration, left eye, with active choroidal neovascularization       Chronic conditions status updated as per HPI.  Other than changes above, cont current medications and maintain follow up with specialists.  Follow up in about 6 months (around 7/17/2024) for Follow up visit.    Future Appointments   Date Time Provider Department Center   3/26/2024 10:00 AM Larissa Lozoya MD [facility]   7/23/2024  2:15 PM Rc Cabrera MD [facility]       Rc Cabrera MD  [facility]

## 2024-01-17 NOTE — PROGRESS NOTES
Chief Complaint  Chief Complaint   Patient presents with    Follow-up     6 month        HPI  Rob Mckeon is a 83 y.o. male with chronic conditions of A fib, HTN, HLD, elevated PSA, insomnia, allergic rhinitis  who presents today for: follow up chronic conditions.  A fib: Sees Dr. Cavazos.  On ASA for anticoagulation. Diltiazem for rate control.  Denies chest pain, shortness of breath, palpitations.  HTN: BP at goal on diltiazem.  HLD: Controlled on lipitor.  LDL 47.2  Insomnia: Controlled on ambien.  No new complaints.  Elevated PSA: PSA 4.0 last check.  No new or worsening obstructive symptoms.    Allergic rhinitis: Controlled with mucinex as needed and zyrtec daily.   Flu shot UTD.  Prevnar, pneumovax UTD.  COVID vaccine UTD.  Shingrix vaccine UTD.       PAST MEDICAL HISTORY:  Past Medical History:   Diagnosis Date    Pericarditis        PAST SURGICAL HISTORY:  Past Surgical History:   Procedure Laterality Date    CHOLECYSTECTOMY      HERNIA REPAIR         SOCIAL HISTORY:  Social History     Socioeconomic History    Marital status:    Tobacco Use    Smoking status: Former    Smokeless tobacco: Never   Substance and Sexual Activity    Alcohol use: Not Currently       FAMILY HISTORY:  Family History   Problem Relation Age of Onset    No Known Problems Mother     No Known Problems Father        ALLERGIES AND MEDICATIONS: updated and reviewed.  Review of patient's allergies indicates:  No Known Allergies  Current Outpatient Medications   Medication Sig Dispense Refill    aspirin (ECOTRIN) 81 MG EC tablet Take 81 mg by mouth.      atorvastatin (LIPITOR) 20 MG tablet TAKE 1 TABLET EVERY DAY 90 tablet 3    cetirizine (ZYRTEC) 10 MG tablet Take 10 mg by mouth.      cholecalciferol, vitamin D3, 10 mcg (400 unit) Cap Take by mouth.      cholecalciferol, vitamin D3, 400 unit Cap Take 400 Int'l Units by mouth.      diltiaZEM (CARDIZEM CD) 120 MG Cp24 TAKE 1 CAPSULE EVERY DAY 90 capsule 3    INV zimura 20 mg/mL  "injection 2 mg by Intravitreal route every 30 days. FOR INVESTIGATIONAL USE ONLY.      zolpidem (AMBIEN) 10 mg Tab Take 1 tablet (10 mg total) by mouth every evening. 90 tablet 2     No current facility-administered medications for this visit.         ROS  Review of Systems        Physical Exam  Vitals:    01/17/24 1348   BP: 130/70   BP Location: Left arm   Patient Position: Sitting   BP Method: Medium (Manual)   Pulse: 70   SpO2: (!) 90%   Weight: 70.1 kg (154 lb 8.7 oz)   Height: 5' 7" (1.702 m)    Body mass index is 24.2 kg/m².  Weight: 70.1 kg (154 lb 8.7 oz)   Height: 5' 7" (170.2 cm)   Physical Exam      Health Maintenance         Date Due Completion Date    TETANUS VACCINE Never done ---    RSV Vaccine (Age 60+ and Pregnant patients) (1 - 1-dose 60+ series) Never done ---    COVID-19 Vaccine (5 - 2023-24 season) 09/01/2023 7/18/2022    Lipid Panel 07/18/2028 7/18/2023              Assessment and Plan:  Paroxysmal atrial fibrillation    Essential hypertension    Hyperlipidemia, mixed    Primary insomnia    Elevated PSA    Allergic rhinitis, unspecified seasonality, unspecified trigger           "

## 2024-01-18 ENCOUNTER — LAB VISIT (OUTPATIENT)
Dept: LAB | Facility: HOSPITAL | Age: 84
End: 2024-01-18
Attending: INTERNAL MEDICINE
Payer: MEDICARE

## 2024-01-18 DIAGNOSIS — R97.20 ELEVATED PSA: ICD-10-CM

## 2024-01-18 DIAGNOSIS — E78.2 HYPERLIPIDEMIA, MIXED: ICD-10-CM

## 2024-01-18 LAB
ALBUMIN SERPL BCP-MCNC: 3.9 G/DL (ref 3.5–5.2)
ALBUMIN SERPL BCP-MCNC: 3.9 G/DL (ref 3.5–5.2)
ALP SERPL-CCNC: 122 U/L (ref 55–135)
ALP SERPL-CCNC: 122 U/L (ref 55–135)
ALT SERPL W/O P-5'-P-CCNC: 34 U/L (ref 10–44)
ALT SERPL W/O P-5'-P-CCNC: 34 U/L (ref 10–44)
ANION GAP SERPL CALC-SCNC: 5 MMOL/L (ref 8–16)
ANION GAP SERPL CALC-SCNC: 5 MMOL/L (ref 8–16)
AST SERPL-CCNC: 30 U/L (ref 10–40)
AST SERPL-CCNC: 30 U/L (ref 10–40)
BILIRUB SERPL-MCNC: 0.8 MG/DL (ref 0.1–1)
BILIRUB SERPL-MCNC: 0.8 MG/DL (ref 0.1–1)
BUN SERPL-MCNC: 21 MG/DL (ref 8–23)
BUN SERPL-MCNC: 21 MG/DL (ref 8–23)
CALCIUM SERPL-MCNC: 9.6 MG/DL (ref 8.7–10.5)
CALCIUM SERPL-MCNC: 9.6 MG/DL (ref 8.7–10.5)
CHLORIDE SERPL-SCNC: 104 MMOL/L (ref 95–110)
CHLORIDE SERPL-SCNC: 104 MMOL/L (ref 95–110)
CHOLEST SERPL-MCNC: 121 MG/DL (ref 120–199)
CHOLEST SERPL-MCNC: 121 MG/DL (ref 120–199)
CHOLEST/HDLC SERPL: 2.6 {RATIO} (ref 2–5)
CHOLEST/HDLC SERPL: 2.6 {RATIO} (ref 2–5)
CO2 SERPL-SCNC: 32 MMOL/L (ref 23–29)
CO2 SERPL-SCNC: 32 MMOL/L (ref 23–29)
CREAT SERPL-MCNC: 0.9 MG/DL (ref 0.5–1.4)
CREAT SERPL-MCNC: 0.9 MG/DL (ref 0.5–1.4)
EST. GFR  (NO RACE VARIABLE): >60 ML/MIN/1.73 M^2
EST. GFR  (NO RACE VARIABLE): >60 ML/MIN/1.73 M^2
GLUCOSE SERPL-MCNC: 95 MG/DL (ref 70–110)
GLUCOSE SERPL-MCNC: 95 MG/DL (ref 70–110)
HDLC SERPL-MCNC: 47 MG/DL (ref 40–75)
HDLC SERPL-MCNC: 47 MG/DL (ref 40–75)
HDLC SERPL: 38.8 % (ref 20–50)
HDLC SERPL: 38.8 % (ref 20–50)
LDLC SERPL CALC-MCNC: 57.6 MG/DL (ref 63–159)
LDLC SERPL CALC-MCNC: 57.6 MG/DL (ref 63–159)
NONHDLC SERPL-MCNC: 74 MG/DL
NONHDLC SERPL-MCNC: 74 MG/DL
POTASSIUM SERPL-SCNC: 5.1 MMOL/L (ref 3.5–5.1)
POTASSIUM SERPL-SCNC: 5.1 MMOL/L (ref 3.5–5.1)
PROSTATE SPECIFIC ANTIGEN, TOTAL: 6.8 NG/ML (ref 0–4)
PROSTATE SPECIFIC ANTIGEN, TOTAL: 6.8 NG/ML (ref 0–4)
PROT SERPL-MCNC: 7.4 G/DL (ref 6–8.4)
PROT SERPL-MCNC: 7.4 G/DL (ref 6–8.4)
PSA FREE MFR SERPL: 36.47 %
PSA FREE MFR SERPL: 36.47 %
PSA FREE SERPL-MCNC: 2.48 NG/ML (ref 0–1.5)
PSA FREE SERPL-MCNC: 2.48 NG/ML (ref 0–1.5)
SODIUM SERPL-SCNC: 141 MMOL/L (ref 136–145)
SODIUM SERPL-SCNC: 141 MMOL/L (ref 136–145)
TRIGL SERPL-MCNC: 82 MG/DL (ref 30–150)
TRIGL SERPL-MCNC: 82 MG/DL (ref 30–150)

## 2024-01-18 PROCEDURE — 80061 LIPID PANEL: CPT | Mod: HCNC | Performed by: INTERNAL MEDICINE

## 2024-01-18 PROCEDURE — 84154 ASSAY OF PSA FREE: CPT | Mod: HCNC | Performed by: INTERNAL MEDICINE

## 2024-01-18 PROCEDURE — 36415 COLL VENOUS BLD VENIPUNCTURE: CPT | Performed by: INTERNAL MEDICINE

## 2024-01-18 PROCEDURE — 80053 COMPREHEN METABOLIC PANEL: CPT | Mod: HCNC | Performed by: INTERNAL MEDICINE

## 2024-01-22 ENCOUNTER — TELEPHONE (OUTPATIENT)
Dept: PRIMARY CARE CLINIC | Facility: CLINIC | Age: 84
End: 2024-01-22
Payer: MEDICARE

## 2024-01-22 NOTE — PROGRESS NOTES
PSA spiked up on this check.  Who was the last urologist pt saw?  Needs to follow up to see if a biopsy is needed.  Otherwise labs look good.

## 2024-01-22 NOTE — TELEPHONE ENCOUNTER
----- Message from Debbie Lloyd sent at 1/22/2024 10:22 AM CST -----  Type:  Needs Medical Advice    Who Called: Pt Spouse   Would the patient rather a call back or a response via [facility]? call  Best Call Back Number: [phone]  Additional Information: Dr Drew Kessler (Urology) Fax # [phone] at [facility] is requesting that  PSA results be faxed over please call regarding

## 2024-01-25 ENCOUNTER — TELEPHONE (OUTPATIENT)
Dept: PRIMARY CARE CLINIC | Facility: CLINIC | Age: 84
End: 2024-01-25
Payer: MEDICARE

## 2024-01-25 NOTE — TELEPHONE ENCOUNTER
----- Message from Yari Veras sent at 1/25/2024  1:39 PM CST -----  Pt Note to Doctor    Who called: Jennifer of Dr. Drew Kessler's office  Who called for pt:  Best call back #: [phone](call) [phone](Dr. Kessler fax)  Add notes: caller said they need the pt's PSA test results sent to them

## 2024-02-01 ENCOUNTER — HOSPITAL ENCOUNTER (OUTPATIENT)
Dept: RADIOLOGY | Facility: HOSPITAL | Age: 84
Discharge: HOME | End: 2024-02-01
Attending: NURSE PRACTITIONER
Payer: MEDICARE

## 2024-02-01 ENCOUNTER — OFFICE VISIT (OUTPATIENT)
Dept: PRIMARY CARE CLINIC | Facility: CLINIC | Age: 84
End: 2024-02-01
Payer: MEDICARE

## 2024-02-01 VITALS
HEART RATE: 61 BPM | SYSTOLIC BLOOD PRESSURE: 130 MMHG | WEIGHT: 157.44 LBS | BODY MASS INDEX: 24.71 KG/M2 | DIASTOLIC BLOOD PRESSURE: 80 MMHG | HEIGHT: 67 IN | OXYGEN SATURATION: 93 %

## 2024-02-01 DIAGNOSIS — R05.3 CHRONIC COUGH: Primary | ICD-10-CM

## 2024-02-01 DIAGNOSIS — I48.0 PAROXYSMAL ATRIAL FIBRILLATION: ICD-10-CM

## 2024-02-01 DIAGNOSIS — R05.3 CHRONIC COUGH: ICD-10-CM

## 2024-02-01 DIAGNOSIS — I10 ESSENTIAL HYPERTENSION: ICD-10-CM

## 2024-02-01 DIAGNOSIS — J30.9 ALLERGIC RHINITIS, UNSPECIFIED SEASONALITY, UNSPECIFIED TRIGGER: ICD-10-CM

## 2024-02-01 PROCEDURE — 71046 X-RAY EXAM CHEST 2 VIEWS: CPT | Mod: TC

## 2024-02-01 PROCEDURE — 99214 OFFICE O/P EST MOD 30 MIN: CPT | Mod: S$GLB,,, | Performed by: NURSE PRACTITIONER

## 2024-02-01 PROCEDURE — 1126F AMNT PAIN NOTED NONE PRSNT: CPT | Mod: CPTII,S$GLB,, | Performed by: NURSE PRACTITIONER

## 2024-02-01 PROCEDURE — 71046 X-RAY EXAM CHEST 2 VIEWS: CPT | Mod: 26,,, | Performed by: INTERNAL MEDICINE

## 2024-02-01 PROCEDURE — 3075F SYST BP GE 130 - 139MM HG: CPT | Mod: CPTII,S$GLB,, | Performed by: NURSE PRACTITIONER

## 2024-02-01 PROCEDURE — 3079F DIAST BP 80-89 MM HG: CPT | Mod: CPTII,S$GLB,, | Performed by: NURSE PRACTITIONER

## 2024-02-01 PROCEDURE — 1159F MED LIST DOCD IN RCRD: CPT | Mod: CPTII,S$GLB,, | Performed by: NURSE PRACTITIONER

## 2024-02-01 PROCEDURE — 99999 PR PBB SHADOW E&M-EST. PATIENT-LVL III: CPT | Mod: PBBFAC,,, | Performed by: NURSE PRACTITIONER

## 2024-02-01 NOTE — PROGRESS NOTES
"[facility] Note    Chief Complaint      Chief Complaint   Patient presents with    Cough       History of Present Illness      Rob Mckeon is a 83 y.o. male with chronic conditions of atrial fibrillation, hypertension, hyperlipidemia, elevated PSA, insomnia, allergic rhinitis who presents today for: had COVID on 1/2, was given paxlovid. Still having productive cough, but overall feels better. Was given Flonase that has really helped. +PND. Denies fever or chills. Appetite is ok. Denies shortness of breath. Pt does endorse previous smoker for "a lot of years"   Sats 93% on RA.     Afib: sees Dr. Lucero on Cardizem, aspirin 81.   HTN: bp at goal today    Past Medical History:  Past Medical History:   Diagnosis Date    Pericarditis        Past Surgical History:   has a past surgical history that includes Cholecystectomy and Hernia repair.    Family History:  family history includes No Known Problems in his father and mother.     Social History:  Social History     Tobacco Use    Smoking status: Former    Smokeless tobacco: Never   Substance Use Topics    Alcohol use: Not Currently       Review of Systems   Constitutional:  Negative for chills and fever.   Respiratory:  Positive for cough, sputum production and wheezing. Negative for shortness of breath.    Cardiovascular:  Negative for chest pain and palpitations.   Gastrointestinal:  Negative for constipation, diarrhea, nausea and vomiting.   Genitourinary:  Negative for dysuria and hematuria.   Musculoskeletal:  Negative for falls.   Neurological:  Negative for headaches.        Medications:  Outpatient Encounter Medications as of 2/1/2024   Medication Sig Dispense Refill    aspirin (ECOTRIN) 81 MG EC tablet Take 81 mg by mouth.      atorvastatin (LIPITOR) 20 MG tablet TAKE 1 TABLET EVERY DAY 90 tablet 3    cetirizine (ZYRTEC) 10 MG tablet Take 10 mg by mouth.      cholecalciferol, vitamin D3, 10 mcg (400 unit) Cap Take by mouth.      " "cholecalciferol, vitamin D3, 400 unit Cap Take 400 Int'l Units by mouth.      diltiaZEM (CARDIZEM CD) 120 MG Cp24 TAKE 1 CAPSULE EVERY DAY 90 capsule 3    fluticasone propionate (FLONASE) 50 mcg/actuation nasal spray 1 spray (50 mcg total) by Each Nostril route 2 (two) times a day. 16 g 3    INV zimura 20 mg/mL injection 2 mg by Intravitreal route every 30 days. FOR INVESTIGATIONAL USE ONLY.      zolpidem (AMBIEN) 10 mg Tab Take 1 tablet (10 mg total) by mouth every evening. 90 tablet 2     No facility-administered encounter medications on file as of 2/1/2024.       Allergies:  Review of patient's allergies indicates:  No Known Allergies    Health Maintenance:  Immunization History   Administered Date(s) Administered    COVID-19, MRNA, LN-S, PF (Pfizer) (Gray Cap) 07/18/2022    COVID-19, MRNA, LN-S, PF (Pfizer) (Purple Cap) 02/10/2021, 03/03/2021, 10/04/2021    Influenza 11/14/2005, 09/16/2009, 09/28/2010, 09/26/2017, 09/14/2018    Influenza (FLUAD) - Quadrivalent - Adjuvanted - PF *Preferred* (65+) 10/02/2020, 11/08/2022, 11/08/2023    Influenza (FLUAD) - Trivalent - Adjuvanted - PF (65+) 09/26/2017, 09/14/2018, 10/08/2019    Influenza - Quadrivalent - High Dose - PF (65 years and older) 11/09/2021    Influenza - Quadrivalent - PF *Preferred* (6 months and older) 11/14/2005, 09/16/2009, 09/28/2010    Pneumococcal Conjugate - 13 Valent 05/30/2015    Pneumococcal Polysaccharide - 23 Valent 09/14/2018    Zoster 10/02/2014    Zoster Recombinant 11/07/2018, 03/11/2019      Health Maintenance   Topic Date Due    TETANUS VACCINE  Never done    Lipid Panel  01/18/2029    Shingles Vaccine  Completed        Physical Exam      Vital Signs  Pulse: 61  SpO2: (!) 93 %  BP: 130/80  BP Location: Right arm  Patient Position: Sitting  Pain Score: 0-No pain  Height and Weight  Height: 5' 7" (170.2 cm)  Weight: 71.4 kg (157 lb 6.5 oz)  BSA (Calculated - sq m): 1.84 sq meters  BMI (Calculated): 24.6  Weight in (lb) to have BMI = 25: " 159.3]    Physical Exam  Constitutional:       Appearance: He is well-developed.   HENT:      Head: Normocephalic and atraumatic.      Nose: Nose normal.      Mouth/Throat:      Mouth: Mucous membranes are moist.   Eyes:      Pupils: Pupils are equal, round, and reactive to light.   Neck:      Thyroid: No thyromegaly.   Cardiovascular:      Rate and Rhythm: Normal rate and regular rhythm.      Heart sounds: No murmur heard.  Pulmonary:      Effort: Pulmonary effort is normal. No respiratory distress.      Breath sounds: Wheezing (exp wheezes) present.   Abdominal:      General: There is no distension.      Palpations: Abdomen is soft.      Tenderness: There is no abdominal tenderness.   Skin:     General: Skin is warm and dry.   Neurological:      Mental Status: He is alert and oriented to person, place, and time.   Psychiatric:         Behavior: Behavior normal.          Laboratory:  CBC:  Recent Labs   Lab 06/07/21  0958 09/05/23  1035   WBC 7.66 7.0   RBC 5.73  --    Hemoglobin 17.1 16.5   Hematocrit 53.1 48.8   Platelets 214  --    MCV 93 87.7   MCH 29.8 29.6   MCHC 32.2 33.7     CMP:  Recent Labs   Lab 07/14/22  0757 07/18/23  0851 01/18/24  0835   Glucose 95 95 95  95   Calcium 9.9 10.0 9.6  9.6   Albumin 4.0 4.0 3.9  3.9   Total Protein 7.4 7.0 7.4  7.4   Sodium 139 141 141  141   Potassium 4.8 5.2 H 5.1  5.1   CO2 30 H 29 32 H  32 H   Chloride 102 103 104  104   BUN 18 17 21  21   Alkaline Phosphatase 131 112 122  122   ALT 20 20 34  34   AST 21 24 30  30   Total Bilirubin 1.0 1.1 H 0.8  0.8     URINALYSIS:       LIPIDS:  Recent Labs   Lab 07/14/22  0757 07/18/23  0851 01/18/24  0835   HDL 41 46 47  47   Cholesterol 104 L 107 L 121  121   Triglycerides 83 69 82  82   LDL Cholesterol 46.4 L 47.2 L 57.6 L  57.6 L   HDL/Cholesterol Ratio 39.4 43.0 38.8  38.8   Non-HDL Cholesterol 63 61 74  74   Total Cholesterol/HDL Ratio 2.5 2.3 2.6  2.6     TSH:      A1C:        Assessment/Plan     Rob ROY  Linda is a 83 y.o.male with:    1. Chronic cough  - X-Ray Chest PA And Lateral; Future, will make recs pending x-ray.  - Complete PFT w/ bronchodilator; Future  - discussed inhaler options   - advised to monitor oxygen sat levels. >92% has pulse ox at home.   - continue flonase, mucinex.     2. Paroxysmal atrial fibrillation  Stable. Continue current meds.      3. Essential hypertension  Stable. Continue current meds.      4. Allergic rhinitis, unspecified seasonality, unspecified trigger  Stable. Continue Flonase       Chronic conditions status updated as per HPI.  Other than changes above, cont current medications and maintain follow up with specialists.  No follow-ups on file.    Future Appointments   Date Time Provider Department Center   3/26/2024 10:00 AM Larissa Lozoya MD [facility]   7/23/2024  2:15 PM Rc Cabrera MD [facility] [place] Adreinne Cotaya, FNP [facility]

## 2024-02-02 DIAGNOSIS — R05.3 CHRONIC COUGH: Primary | ICD-10-CM

## 2024-02-21 ENCOUNTER — TELEPHONE (OUTPATIENT)
Dept: PRIMARY CARE CLINIC | Facility: CLINIC | Age: 84
End: 2024-02-21
Payer: MEDICARE

## 2024-02-21 NOTE — TELEPHONE ENCOUNTER
Let's have him do his chest x-ray today or tomorrow. Pulmonology appointment is too far away. I can see him.

## 2024-02-21 NOTE — TELEPHONE ENCOUNTER
----- Message from Debbie Lloyd sent at 2/21/2024  7:59 AM CST -----  Type:  Sooner Apoointment Request    Caller is requesting a sooner appointment. Caller will  accept being placed on the waitlist and is requesting a message be sent to doctor.  Name of Caller:pt spouse  When is the first available appointment?none  Symptoms:sick cough 3 weeks  Would the patient rather a call back or a response via [facility]? call  Best Call Back Number:[phone]  Additional Information: pt spouse Radha Geoffreyjuancarlos  will be seen in office on 02/22/2024 at 1:30 would like to be seen together but epic does not have that availability please call pt regarding this matter

## 2024-02-21 NOTE — TELEPHONE ENCOUNTER
Does he need to be seen again or does he just need to wait for his appt with Pulmonology? He is scheduled for repeat chest xray on 2/29

## 2024-02-22 ENCOUNTER — HOSPITAL ENCOUNTER (OUTPATIENT)
Dept: RADIOLOGY | Facility: HOSPITAL | Age: 84
Discharge: HOME | End: 2024-02-22
Attending: NURSE PRACTITIONER
Payer: MEDICARE

## 2024-02-22 DIAGNOSIS — R05.3 CHRONIC COUGH: ICD-10-CM

## 2024-02-22 PROCEDURE — 71046 X-RAY EXAM CHEST 2 VIEWS: CPT | Mod: 26,,, | Performed by: RADIOLOGY

## 2024-02-22 PROCEDURE — 71046 X-RAY EXAM CHEST 2 VIEWS: CPT | Mod: TC

## 2024-02-23 DIAGNOSIS — J40 BRONCHITIS: Primary | ICD-10-CM

## 2024-02-23 DIAGNOSIS — R05.3 CHRONIC COUGH: ICD-10-CM

## 2024-02-23 RX ORDER — FLUTICASONE FUROATE AND VILANTEROL 100; 25 UG/1; UG/1
1 POWDER RESPIRATORY (INHALATION) DAILY
Qty: 14 EACH | Refills: 3 | Status: SHIPPED | OUTPATIENT
Start: 2024-02-23

## 2024-03-20 ENCOUNTER — OFFICE VISIT (OUTPATIENT)
Dept: PULMONOLOGY | Facility: CLINIC | Age: 84
End: 2024-03-20
Payer: MEDICARE

## 2024-03-20 VITALS
SYSTOLIC BLOOD PRESSURE: 142 MMHG | HEART RATE: 66 BPM | OXYGEN SATURATION: 97 % | DIASTOLIC BLOOD PRESSURE: 81 MMHG | BODY MASS INDEX: 24.43 KG/M2 | HEIGHT: 67 IN | WEIGHT: 155.63 LBS

## 2024-03-20 DIAGNOSIS — J30.9 ALLERGIC RHINITIS, UNSPECIFIED SEASONALITY, UNSPECIFIED TRIGGER: Primary | ICD-10-CM

## 2024-03-20 DIAGNOSIS — R29.898 MUSCULAR DECONDITIONING: ICD-10-CM

## 2024-03-20 DIAGNOSIS — R05.3 CHRONIC COUGH: ICD-10-CM

## 2024-03-20 PROCEDURE — 3288F FALL RISK ASSESSMENT DOCD: CPT | Mod: CPTII,S$GLB,, | Performed by: INTERNAL MEDICINE

## 2024-03-20 PROCEDURE — 99204 OFFICE O/P NEW MOD 45 MIN: CPT | Mod: S$GLB,,, | Performed by: INTERNAL MEDICINE

## 2024-03-20 PROCEDURE — 1126F AMNT PAIN NOTED NONE PRSNT: CPT | Mod: CPTII,S$GLB,, | Performed by: INTERNAL MEDICINE

## 2024-03-20 PROCEDURE — 1101F PT FALLS ASSESS-DOCD LE1/YR: CPT | Mod: CPTII,S$GLB,, | Performed by: INTERNAL MEDICINE

## 2024-03-20 PROCEDURE — 1159F MED LIST DOCD IN RCRD: CPT | Mod: CPTII,S$GLB,, | Performed by: INTERNAL MEDICINE

## 2024-03-20 PROCEDURE — 99999 PR PBB SHADOW E&M-EST. PATIENT-LVL III: CPT | Mod: PBBFAC,,, | Performed by: INTERNAL MEDICINE

## 2024-03-20 RX ORDER — CHOLECALCIFEROL (VITAMIN D3) 25 MCG
1000 TABLET ORAL DAILY
COMMUNITY

## 2024-03-20 NOTE — PROGRESS NOTES
Subjective:       Patient ID: Rob Mckeon is a 84 y.o. male.    Chief Complaint: No chief complaint on file.    85 yo male in usual state of health until got COVID infection. He had 2-3 months of cough and intermittent dyspnea. Unable to walk up stairs in his home. He had multiple CXR showing mild interstitial prominence but no PNA. He notes over the last week that his cough is resolved and exercise tolerance is improved. He was prescribed Breo inhaler 2/2 smoking history. He quit smoking 40 years ago. Did have a 30 pack year history prior. No baseline cough or dyspnea. He remains quite deconditioned currently. No other complaints or concerns  Review of Systems   All other systems reviewed and are negative.      Past Medical History:   Diagnosis Date    Pericarditis         Family History   Problem Relation Age of Onset    No Known Problems Mother     No Known Problems Father       If not mentioned in HPI, Family history is reviewed and not contributory    Social History     Tobacco Use    Smoking status: Former    Smokeless tobacco: Never   Substance Use Topics    Alcohol use: Not Currently        Objective:        Vitals:    03/20/24 1357   BP: (!) 142/81   Pulse: 66     Wt Readings from Last 3 Encounters:   03/20/24 70.6 kg (155 lb 10.3 oz)   02/01/24 71.4 kg (157 lb 6.5 oz)   01/17/24 70.1 kg (154 lb 8.7 oz)     Temp Readings from Last 3 Encounters:   07/13/22 97.6 °F (36.4 °C) (Oral)   04/18/22 97.7 °F (36.5 °C) (Oral)   01/11/22 98.7 °F (37.1 °C)     BP Readings from Last 3 Encounters:   03/20/24 (!) 142/81   02/01/24 130/80   01/17/24 130/70     Pulse Readings from Last 3 Encounters:   03/20/24 66   02/01/24 61   01/17/24 70       Physical Exam   Constitutional: He is oriented to person, place, and time. He appears well-developed and well-nourished.   HENT:   Head: Normocephalic.   Mouth/Throat: Oropharynx is clear and moist.   Cardiovascular: Normal rate and regular rhythm.   Pulmonary/Chest: Normal  expansion, symmetric chest wall expansion, effort normal and breath sounds normal. He has no wheezes.   Abdominal: Soft. He exhibits no distension.   Musculoskeletal:         General: No edema. Normal range of motion.   Lymphadenopathy: No supraclavicular adenopathy is present.     He has no cervical adenopathy.   Neurological: He is alert and oriented to person, place, and time. Gait normal.   Skin: Skin is warm and dry. No rash noted.   Psychiatric: He has a normal mood and affect. His behavior is normal. Thought content normal.   Vitals reviewed.    CBC  Lab Results   Component Value Date    WBC 7.0 09/05/2023    HGB 16.5 09/05/2023    HCT 48.8 09/05/2023    MCV 87.7 09/05/2023     06/07/2021         CMP  Sodium   Date Value Ref Range Status   01/18/2024 141 136 - 145 mmol/L Final   01/18/2024 141 136 - 145 mmol/L Final     Potassium   Date Value Ref Range Status   01/18/2024 5.1 3.5 - 5.1 mmol/L Final   01/18/2024 5.1 3.5 - 5.1 mmol/L Final     Chloride   Date Value Ref Range Status   01/18/2024 104 95 - 110 mmol/L Final   01/18/2024 104 95 - 110 mmol/L Final     CO2   Date Value Ref Range Status   01/18/2024 32 (H) 23 - 29 mmol/L Final   01/18/2024 32 (H) 23 - 29 mmol/L Final     Glucose   Date Value Ref Range Status   01/18/2024 95 70 - 110 mg/dL Final   01/18/2024 95 70 - 110 mg/dL Final     BUN   Date Value Ref Range Status   01/18/2024 21 8 - 23 mg/dL Final   01/18/2024 21 8 - 23 mg/dL Final     Creatinine   Date Value Ref Range Status   01/18/2024 0.9 0.5 - 1.4 mg/dL Final   01/18/2024 0.9 0.5 - 1.4 mg/dL Final     Calcium   Date Value Ref Range Status   01/18/2024 9.6 8.7 - 10.5 mg/dL Final   01/18/2024 9.6 8.7 - 10.5 mg/dL Final     Total Protein   Date Value Ref Range Status   01/18/2024 7.4 6.0 - 8.4 g/dL Final   01/18/2024 7.4 6.0 - 8.4 g/dL Final     Albumin   Date Value Ref Range Status   01/18/2024 3.9 3.5 - 5.2 g/dL Final   01/18/2024 3.9 3.5 - 5.2 g/dL Final     Total Bilirubin   Date  "Value Ref Range Status   01/18/2024 0.8 0.1 - 1.0 mg/dL Final     Comment:     For infants and newborns, interpretation of results should be based  on gestational age, weight and in agreement with clinical  observations.    Premature Infant recommended reference ranges:  Up to 24 hours.............<8.0 mg/dL  Up to 48 hours............<12.0 mg/dL  3-5 days..................<15.0 mg/dL  6-29 days.................<15.0 mg/dL     01/18/2024 0.8 0.1 - 1.0 mg/dL Final     Comment:     For infants and newborns, interpretation of results should be based  on gestational age, weight and in agreement with clinical  observations.    Premature Infant recommended reference ranges:  Up to 24 hours.............<8.0 mg/dL  Up to 48 hours............<12.0 mg/dL  3-5 days..................<15.0 mg/dL  6-29 days.................<15.0 mg/dL       Alkaline Phosphatase   Date Value Ref Range Status   01/18/2024 122 55 - 135 U/L Final   01/18/2024 122 55 - 135 U/L Final     AST   Date Value Ref Range Status   01/18/2024 30 10 - 40 U/L Final   01/18/2024 30 10 - 40 U/L Final     ALT   Date Value Ref Range Status   01/18/2024 34 10 - 44 U/L Final   01/18/2024 34 10 - 44 U/L Final     Anion Gap   Date Value Ref Range Status   01/18/2024 5 (L) 8 - 16 mmol/L Final   01/18/2024 5 (L) 8 - 16 mmol/L Final     eGFR   Date Value Ref Range Status   01/18/2024 >60.0 >60 mL/min/1.73 m^2 Final   01/18/2024 >60.0 >60 mL/min/1.73 m^2 Final       ABG  No results found for: "PH", "PO2", "PCO2"        Personal Diagnostic Review  I have personally reviewed the following data and added my own interpretation as below:  Chest x-ray: minimal basilar atelectasis +/- effusions      3/20/2024     1:57 PM 2/1/2024     9:36 AM 1/17/2024     1:48 PM 7/21/2023    10:32 AM 7/17/2023     1:32 PM 4/28/2023     1:45 PM 1/17/2023     1:25 PM   Pulmonary Function Tests   SpO2 97 % 93 % 90 %  91 % 91 % 90 %   Height 5' 7" (1.702 m) 5' 7" (1.702 m) 5' 7" (1.702 m) 5' 7" (1.702 " "m) 5' 7" (1.702 m) 5' 7" (1.702 m) 5' 7" (1.702 m)   Weight 70.6 kg (155 lb 10.3 oz) 71.4 kg (157 lb 6.5 oz) 70.1 kg (154 lb 8.7 oz) 70 kg (154 lb 4.8 oz) 70.8 kg (156 lb 1.4 oz) 71.5 kg (157 lb 10.1 oz) 72 kg (158 lb 13.5 oz)   BMI (Calculated) 24.4 24.6 24.2 24.2 24.4 24.7 24.9         Assessment:       1. Allergic rhinitis, unspecified seasonality, unspecified trigger    2. Chronic cough    3. Muscular deconditioning        Outpatient Encounter Medications as of 3/20/2024   Medication Sig Dispense Refill    aspirin (ECOTRIN) 81 MG EC tablet Take 81 mg by mouth.      atorvastatin (LIPITOR) 20 MG tablet TAKE 1 TABLET EVERY DAY 90 tablet 3    cetirizine (ZYRTEC) 10 MG tablet Take 10 mg by mouth.      cholecalciferol, vitamin D3, 10 mcg (400 unit) Cap Take by mouth.      diltiaZEM (CARDIZEM CD) 120 MG Cp24 TAKE 1 CAPSULE EVERY DAY 90 capsule 3    fluticasone furoate-vilanteroL (BREO ELLIPTA) 100-25 mcg/dose diskus inhaler Inhale 1 puff into the lungs once daily. Controller 14 each 3    fluticasone propionate (FLONASE) 50 mcg/actuation nasal spray 1 spray (50 mcg total) by Each Nostril route 2 (two) times a day. 16 g 3    INV zimura 20 mg/mL injection 2 mg by Intravitreal route every 30 days. FOR INVESTIGATIONAL USE ONLY.      zolpidem (AMBIEN) 10 mg Tab Take 1 tablet (10 mg total) by mouth every evening. 90 tablet 2    cholecalciferol, vitamin D3, 400 unit Cap Take 400 Int'l Units by mouth.      vitamin D (VITAMIN D3) 1000 units Tab Take 1,000 Units by mouth once daily.       No facility-administered encounter medications on file as of 3/20/2024.     1. Chronic cough  - Ambulatory referral/consult to Pulmonology    2. Allergic rhinitis, unspecified seasonality, unspecified trigger    3. Muscular deconditioning    Plan:     Problem List Items Addressed This Visit          ENT    Allergic rhinitis - Primary    Current Assessment & Plan     I feel his prolonged symptoms were from COVID. Stopping Breo inhaler. " Discussed with patient.            Orthopedic    Muscular deconditioning    Current Assessment & Plan     Discussed importance of regular exercise and maintaing activity level after significant illness.          Other Visit Diagnoses       Chronic cough                  No follow-ups on file.    Future Appointments   Date Time Provider Department Center   3/26/2024 10:00 AM Larissa Lozoya MD [facility]  [place] [facility]   7/23/2024  2:15 PM Rc Cabrera MD [facility]           Brandon Kim MD

## 2024-03-26 ENCOUNTER — OFFICE VISIT (OUTPATIENT)
Dept: NEUROLOGY | Facility: CLINIC | Age: 84
End: 2024-03-26
Payer: MEDICARE

## 2024-03-26 ENCOUNTER — LAB VISIT (OUTPATIENT)
Dept: LAB | Facility: HOSPITAL | Age: 84
End: 2024-03-26
Attending: PSYCHIATRY & NEUROLOGY
Payer: MEDICARE

## 2024-03-26 VITALS
SYSTOLIC BLOOD PRESSURE: 164 MMHG | BODY MASS INDEX: 23.88 KG/M2 | HEIGHT: 67 IN | WEIGHT: 152.13 LBS | HEART RATE: 71 BPM | DIASTOLIC BLOOD PRESSURE: 87 MMHG

## 2024-03-26 DIAGNOSIS — R41.3 MEMORY CHANGE: ICD-10-CM

## 2024-03-26 DIAGNOSIS — R25.3 MUSCLE TWITCHING: ICD-10-CM

## 2024-03-26 DIAGNOSIS — R25.3 MUSCLE TWITCHING: Primary | ICD-10-CM

## 2024-03-26 DIAGNOSIS — R41.3 OTHER AMNESIA: ICD-10-CM

## 2024-03-26 DIAGNOSIS — G51.4 FACIAL TWITCHING: ICD-10-CM

## 2024-03-26 LAB
ALBUMIN SERPL BCP-MCNC: 4.4 G/DL (ref 3.5–5.2)
ALP SERPL-CCNC: 137 U/L (ref 55–135)
ALT SERPL W/O P-5'-P-CCNC: 28 U/L (ref 10–44)
ANION GAP SERPL CALC-SCNC: 13 MMOL/L (ref 8–16)
AST SERPL-CCNC: 30 U/L (ref 10–40)
BILIRUB SERPL-MCNC: 1 MG/DL (ref 0.1–1)
BUN SERPL-MCNC: 13 MG/DL (ref 8–23)
CALCIUM SERPL-MCNC: 9.9 MG/DL (ref 8.7–10.5)
CHLORIDE SERPL-SCNC: 100 MMOL/L (ref 95–110)
CO2 SERPL-SCNC: 27 MMOL/L (ref 23–29)
CREAT SERPL-MCNC: 0.8 MG/DL (ref 0.5–1.4)
EST. GFR  (NO RACE VARIABLE): >60 ML/MIN/1.73 M^2
FOLATE SERPL-MCNC: 14.2 NG/ML (ref 4–24)
GLUCOSE SERPL-MCNC: 82 MG/DL (ref 70–110)
MAGNESIUM SERPL-MCNC: 2.1 MG/DL (ref 1.6–2.6)
POTASSIUM SERPL-SCNC: 4.6 MMOL/L (ref 3.5–5.1)
PROT SERPL-MCNC: 7.7 G/DL (ref 6–8.4)
SODIUM SERPL-SCNC: 140 MMOL/L (ref 136–145)
TSH SERPL DL<=0.005 MIU/L-ACNC: 1.37 UIU/ML (ref 0.4–4)
VIT B12 SERPL-MCNC: 879 PG/ML (ref 210–950)

## 2024-03-26 PROCEDURE — 1101F PT FALLS ASSESS-DOCD LE1/YR: CPT | Mod: HCNC,CPTII,S$GLB, | Performed by: PSYCHIATRY & NEUROLOGY

## 2024-03-26 PROCEDURE — 99999 PR PBB SHADOW E&M-EST. PATIENT-LVL III: CPT | Mod: PBBFAC,HCNC,, | Performed by: PSYCHIATRY & NEUROLOGY

## 2024-03-26 PROCEDURE — 84443 ASSAY THYROID STIM HORMONE: CPT | Performed by: PSYCHIATRY & NEUROLOGY

## 2024-03-26 PROCEDURE — 3288F FALL RISK ASSESSMENT DOCD: CPT | Mod: HCNC,CPTII,S$GLB, | Performed by: PSYCHIATRY & NEUROLOGY

## 2024-03-26 PROCEDURE — 82746 ASSAY OF FOLIC ACID SERUM: CPT | Performed by: PSYCHIATRY & NEUROLOGY

## 2024-03-26 PROCEDURE — 82607 VITAMIN B-12: CPT | Performed by: PSYCHIATRY & NEUROLOGY

## 2024-03-26 PROCEDURE — 80053 COMPREHEN METABOLIC PANEL: CPT | Performed by: PSYCHIATRY & NEUROLOGY

## 2024-03-26 PROCEDURE — 1159F MED LIST DOCD IN RCRD: CPT | Mod: HCNC,CPTII,S$GLB, | Performed by: PSYCHIATRY & NEUROLOGY

## 2024-03-26 PROCEDURE — 36415 COLL VENOUS BLD VENIPUNCTURE: CPT | Performed by: PSYCHIATRY & NEUROLOGY

## 2024-03-26 PROCEDURE — 83735 ASSAY OF MAGNESIUM: CPT | Performed by: PSYCHIATRY & NEUROLOGY

## 2024-03-26 PROCEDURE — 1160F RVW MEDS BY RX/DR IN RCRD: CPT | Mod: HCNC,CPTII,S$GLB, | Performed by: PSYCHIATRY & NEUROLOGY

## 2024-03-26 PROCEDURE — 1126F AMNT PAIN NOTED NONE PRSNT: CPT | Mod: HCNC,CPTII,S$GLB, | Performed by: PSYCHIATRY & NEUROLOGY

## 2024-03-26 PROCEDURE — 99205 OFFICE O/P NEW HI 60 MIN: CPT | Mod: HCNC,S$GLB,, | Performed by: PSYCHIATRY & NEUROLOGY

## 2024-03-26 NOTE — PROGRESS NOTES
"Subjective:       Patient ID: Rob Mckeon is a 84 y.o. male.    Chief Complaint: Eye Problem (Eye problem/twitching)      Mr Mckeon is an 84 yr old WM who has hypertension hyperlipidemia atrial fibrillation macular degeneration and an elevated PSA who presents with his wife and is here because of twitching in his right eye.  He believes it began about a year ago and occurs on and off on a frequent basis.  He does not know what triggers it but his wife notes stress or anxiety is 1 trigger.  He is not actually easily prone to stress and she states he is a  highly laid back person and has been all of his life.      He had surgery to remove skin cancer of the nose a couple of years ago and since then when he rubs his right cheek he feels a tingling in his nose.  On examination he is also noted to have a slight droop of the upper lip on the right side.  He was not aware of it.  He denies a history of Bell's palsy.  He does not have headaches.    Denies myoclonus and denies any twitching of the limbs.  He has a little bit of poor balance but he ambulates unassisted and never falls.    He does not drink a lot of caffeine specifically to took 2 cups of tea in the morning and a small diet soda for supper.  No coffee no alcohol     He is noted to have a soft raspy voice and he states that this is strictly due to allergies that his voice problems or new in come and go.  He does not note any difficulty swallowing.      When specifically asked about memory they both note that he has a good memory but when tested he had some trouble with recall.    His wife is frustrated because he is "lazy".  He has never been an exerciser and while he does do some light chores around the house he tends to like to sit and lay around.  In January he had COVID manifested by a bad cough.  Chest x-ray revealed a pleural effusion and atelectasis and he is now under the care of a pulmonologist.  He is improving.  He has lost 9 lb however.  He is a " former light smoker; quit 40 years ago.  Thirty pack year history     Supplements include a multivitamin and emergency           Past Medical History:   Diagnosis Date    Pericarditis       Past Surgical History:   Procedure Laterality Date    CHOLECYSTECTOMY      HERNIA REPAIR          Current Outpatient Medications:     aspirin (ECOTRIN) 81 MG EC tablet, Take 81 mg by mouth., Disp: , Rfl:     atorvastatin (LIPITOR) 20 MG tablet, TAKE 1 TABLET EVERY DAY, Disp: 90 tablet, Rfl: 3    cetirizine (ZYRTEC) 10 MG tablet, Take 10 mg by mouth., Disp: , Rfl:     cholecalciferol, vitamin D3, 10 mcg (400 unit) Cap, Take by mouth., Disp: , Rfl:     cholecalciferol, vitamin D3, 400 unit Cap, Take 400 Int'l Units by mouth., Disp: , Rfl:     diltiaZEM (CARDIZEM CD) 120 MG Cp24, TAKE 1 CAPSULE EVERY DAY, Disp: 90 capsule, Rfl: 3    fluticasone furoate-vilanteroL (BREO ELLIPTA) 100-25 mcg/dose diskus inhaler, Inhale 1 puff into the lungs once daily. Controller, Disp: 14 each, Rfl: 3    fluticasone propionate (FLONASE) 50 mcg/actuation nasal spray, 1 spray (50 mcg total) by Each Nostril route 2 (two) times a day., Disp: 16 g, Rfl: 3    INV zimura 20 mg/mL injection, 2 mg by Intravitreal route every 30 days. FOR INVESTIGATIONAL USE ONLY., Disp: , Rfl:     vitamin D (VITAMIN D3) 1000 units Tab, Take 1,000 Units by mouth once daily., Disp: , Rfl:     zolpidem (AMBIEN) 10 mg Tab, Take 1 tablet (10 mg total) by mouth every evening., Disp: 90 tablet, Rfl: 2   Review of patient's allergies indicates:  No Known Allergies     Review of Systems   Constitutional:  Positive for unexpected weight change (9 lbs, over 3-4 mos). Negative for appetite change (eats well).   HENT:  Positive for hearing loss (all of his life; wears hearing aids), postnasal drip and voice change.    Neurological:  Positive for weakness (he feels that he is not as strong as he used to be). Negative for numbness, headaches and memory loss (his wife agrees that his memory  is great).   Psychiatric/Behavioral:  The patient is not nervous/anxious.            Objective:      Physical Exam  Constitutional:       Appearance: Normal appearance. He is not ill-appearing.      Comments: Appears young for age   Neurological:      Mental Status: He is alert.      Cranial Nerves: Cranial nerve deficit (subtle Rt upper lip droop) present. No dysarthria.      Motor: Tremor (subtle low amplitude fast rythmic twitch of the Rt upper and lower lids, once during the visit) and abnormal muscle tone present. No weakness.      Coordination: Coordination normal.      Gait: Gait (normal stride, walks w/o AD. upper body mildy flexed forward) normal.      Comments: Date Tuesday, March 2024, does not know the date, even when told Easter is coming    Delayed recall   2/5    EOMI               Assessment:       1. Muscle twitching    2. Facial twitching    3. Memory change    4. Other amnesia        Plan:            One year history of intermittent tremor of the  right eye upper and lower lids.  This is benign and typical of a tremor that we see associated with anxiety or excessive caffeine.    It is interesting however that he has a subtle upper lip droop on the right side and dysesthesias in the right cheek. This could be related to his previous extensive surgery of the right nose for skin cancer.    However plan MRI brain.        Amnestic MCI, mild.  Does not fulfill criteria for dementia.    MRI brain will address this as well.  Plan laboratory as well.                  Larissa Lozoya MD   03/26/2024   11:14 AM

## 2024-04-11 ENCOUNTER — TELEPHONE (OUTPATIENT)
Dept: PRIMARY CARE CLINIC | Facility: CLINIC | Age: 84
End: 2024-04-11
Payer: MEDICARE

## 2024-04-11 NOTE — TELEPHONE ENCOUNTER
----- Message from Lorena Amado sent at 4/11/2024 11:53 AM CDT -----  Regarding: call  Type:  Needs Medical Advice    Who Called: pt's wife  Would the patient rather a call back or a response via [facility]? Call  Best Call Back Number: [phone]  Additional Information: pt's wife would like to speak with  due to her 's coughing up stuff, she stated he loss weight and needs answers on what she should do

## 2024-04-11 NOTE — TELEPHONE ENCOUNTER
Spoke with pt wife. Pt c/o SOB, extreme fatigue, trouble with walking long distances, weight loss of at least 10 lbs. Chronic productive coughing and with no improvement. Saw [facility] on 3/20, has seen neuro, pulm and cardio in the last 2 weeks. They advised for him to try and walk up to an hour a day and he can hardly get out of bed to even walk.     Advised to report to ER and advise specialists on current status.

## 2024-04-12 DIAGNOSIS — F51.01 PRIMARY INSOMNIA: ICD-10-CM

## 2024-04-12 NOTE — TELEPHONE ENCOUNTER
----- Message from Bernardino Byrnes sent at 4/12/2024 12:09 PM CDT -----  Contact: spouse  Type:  Needs Medical Advice    Who Called: spouse   Would the patient rather a call back or a response via [facility]? call  Best Call Back Number: [phone]  Additional Information:   Spouse requesting a call regarding pt's care and chest xray. Pt in the ED.     Type:  RX Refill Request    Who Called: spouse   Refill or New Rx:refill  RX Name and Strength:zolpidem (AMBIEN) 10 mg Tab  Preferred Pharmacy with phone number:[facility] - [place], [place] - [address]  [address] [address]  Phone: [phone] Fax: [phone]  Local or Mail Order:local  Ordering Provider:Deborah  Would the patient rather a call back or a response via [facility]? call  Best Call Back Number:  Additional Information:           
No care due was identified.  [facility] Embedded Care Due Messages. Reference number: [id number].   4/12/2024 1:38:11 PM CDT  
Wife called to advise pt was admitted yesterday [place] [facility] and dx with pneumonia. Would like for Dr. Cabrera to review recent XR results and chart notes from ED     Also asking for refill on Ambien to [facility]   
[FreeTextEntry2] : B/L knee pain

## 2024-04-15 ENCOUNTER — TELEPHONE (OUTPATIENT)
Dept: PRIMARY CARE CLINIC | Facility: CLINIC | Age: 84
End: 2024-04-15
Payer: MEDICARE

## 2024-04-15 DIAGNOSIS — J18.9 PNEUMONIA DUE TO INFECTIOUS ORGANISM, UNSPECIFIED LATERALITY, UNSPECIFIED PART OF LUNG: Primary | ICD-10-CM

## 2024-04-15 RX ORDER — ZOLPIDEM TARTRATE 10 MG/1
10 TABLET ORAL NIGHTLY
Qty: 90 TABLET | Refills: 2 | Status: SHIPPED | OUTPATIENT
Start: 2024-04-15

## 2024-04-15 NOTE — TELEPHONE ENCOUNTER
----- Message from Lorena Amado sent at 4/15/2024 12:54 PM CDT -----  Regarding: appointment  Type:  Chest X Ray     Who Called: pt  Would the patient rather a call back or a response via [facility]? Call  Best Call Back Number: [phone]  Additional Information: pt's wife is calling regards to her  recently visit at the Er they stated he needed to set up an appointment for a chest x ray

## 2024-05-28 ENCOUNTER — HOSPITAL ENCOUNTER (OUTPATIENT)
Dept: RADIOLOGY | Facility: HOSPITAL | Age: 84
Discharge: HOME | End: 2024-05-28
Attending: INTERNAL MEDICINE
Payer: MEDICARE

## 2024-05-28 DIAGNOSIS — J18.9 PNEUMONIA DUE TO INFECTIOUS ORGANISM, UNSPECIFIED LATERALITY, UNSPECIFIED PART OF LUNG: ICD-10-CM

## 2024-05-28 PROCEDURE — 71046 X-RAY EXAM CHEST 2 VIEWS: CPT | Mod: TC

## 2024-05-28 PROCEDURE — 71046 X-RAY EXAM CHEST 2 VIEWS: CPT | Mod: 26,,, | Performed by: RADIOLOGY

## 2024-07-22 DIAGNOSIS — E78.2 HYPERLIPIDEMIA, MIXED: ICD-10-CM

## 2024-07-22 RX ORDER — ATORVASTATIN CALCIUM 20 MG/1
TABLET, FILM COATED ORAL
Qty: 90 TABLET | Refills: 1 | Status: SHIPPED | OUTPATIENT
Start: 2024-07-22

## 2024-07-22 NOTE — TELEPHONE ENCOUNTER
No care due was identified.  [facility] Embedded Care Due Messages. Reference number: [id number].   7/22/2024 1:33:14 AM CDT

## 2024-07-22 NOTE — TELEPHONE ENCOUNTER
Rob Mckeon  is requesting a refill authorization.  Brief Assessment and Rationale for Refill:  Approve     Medication Therapy Plan:         Comments:     Note composed:3:24 AM 07/22/2024

## 2024-07-23 ENCOUNTER — OFFICE VISIT (OUTPATIENT)
Dept: PRIMARY CARE CLINIC | Facility: CLINIC | Age: 84
End: 2024-07-23
Payer: MEDICARE

## 2024-07-23 VITALS
HEART RATE: 55 BPM | WEIGHT: 152.13 LBS | BODY MASS INDEX: 23.88 KG/M2 | OXYGEN SATURATION: 90 % | HEIGHT: 67 IN | DIASTOLIC BLOOD PRESSURE: 70 MMHG | SYSTOLIC BLOOD PRESSURE: 126 MMHG

## 2024-07-23 DIAGNOSIS — I10 ESSENTIAL HYPERTENSION: ICD-10-CM

## 2024-07-23 DIAGNOSIS — I48.0 PAROXYSMAL ATRIAL FIBRILLATION: ICD-10-CM

## 2024-07-23 DIAGNOSIS — F51.01 PRIMARY INSOMNIA: ICD-10-CM

## 2024-07-23 DIAGNOSIS — E78.2 HYPERLIPIDEMIA, MIXED: ICD-10-CM

## 2024-07-23 DIAGNOSIS — R97.20 ELEVATED PSA: ICD-10-CM

## 2024-07-23 DIAGNOSIS — R41.3 MEMORY DEFICIT: Primary | ICD-10-CM

## 2024-07-23 PROCEDURE — 1126F AMNT PAIN NOTED NONE PRSNT: CPT | Mod: CPTII,S$GLB,, | Performed by: INTERNAL MEDICINE

## 2024-07-23 PROCEDURE — 99214 OFFICE O/P EST MOD 30 MIN: CPT | Mod: S$GLB,,, | Performed by: INTERNAL MEDICINE

## 2024-07-23 PROCEDURE — 99999 PR PBB SHADOW E&M-EST. PATIENT-LVL III: CPT | Mod: PBBFAC,,, | Performed by: INTERNAL MEDICINE

## 2024-07-23 PROCEDURE — 1159F MED LIST DOCD IN RCRD: CPT | Mod: CPTII,S$GLB,, | Performed by: INTERNAL MEDICINE

## 2024-07-23 NOTE — PROGRESS NOTES
[facility] Note    Chief Complaint      Chief Complaint   Patient presents with    Follow-up       History of Present Illness      Rob Mckeon is a 84 y.o. male with chronic conditions of A fib, HTN, HLD, elevated PSA, insomnia, allergic rhinitis who presents today for: follow up chronic conditions.  A fib: Sees Dr. Cavazos.  On ASA for anticoagulation. Diltiazem for rate control.  Denies chest pain, shortness of breath, palpitations.  HTN: BP at goal on diltiazem.  HLD: Controlled on lipitor.  LDL 46.  Insomnia: Controlled on ambien.  No new complaints.  Elevated PSA: PSA 6.8 last check.  No new or worsening obstructive symptoms.    Allergic rhinitis: Controlled with mucinex as needed and zyrtec daily.   Flu shot UTD.  Prevnar, pneumovax UTD.  COVID vaccine UTD.  Shingrix vaccine UTD.  Cscope Dr. Toscano, unsure when, will get record. Hx of polyp, 5 yr interval.     Past Medical History:  Past Medical History:   Diagnosis Date    Pericarditis        Past Surgical History:   has a past surgical history that includes Cholecystectomy and Hernia repair.    Family History:  family history includes No Known Problems in his father and mother.     Social History:  Social History     Tobacco Use    Smoking status: Former    Smokeless tobacco: Never   Substance Use Topics    Alcohol use: Not Currently       I personally reviewed all past medical, surgical, social and family history.    Review of Systems   Constitutional:  Negative for chills, fever and malaise/fatigue.   Respiratory:  Negative for shortness of breath.    Cardiovascular:  Negative for chest pain.   Gastrointestinal:  Negative for constipation, diarrhea, nausea and vomiting.   Skin:  Negative for rash.   Neurological:  Negative for weakness.   All other systems reviewed and are negative.       Medications:  Outpatient Encounter Medications as of 7/23/2024   Medication Sig Dispense Refill    aspirin (ECOTRIN) 81 MG EC tablet Take 81 mg by  mouth.      atorvastatin (LIPITOR) 20 MG tablet TAKE 1 TABLET EVERY DAY 90 tablet 1    cetirizine (ZYRTEC) 10 MG tablet Take 10 mg by mouth.      cholecalciferol, vitamin D3, 10 mcg (400 unit) Cap Take by mouth.      cholecalciferol, vitamin D3, 400 unit Cap Take 400 Int'l Units by mouth.      diltiaZEM (CARDIZEM CD) 120 MG Cp24 TAKE 1 CAPSULE EVERY DAY 90 capsule 3    fluticasone furoate-vilanteroL (BREO ELLIPTA) 100-25 mcg/dose diskus inhaler Inhale 1 puff into the lungs once daily. Controller 14 each 3    fluticasone propionate (FLONASE) 50 mcg/actuation nasal spray 1 spray (50 mcg total) by Each Nostril route 2 (two) times a day. 16 g 3    INV zimura 20 mg/mL injection 2 mg by Intravitreal route every 30 days. FOR INVESTIGATIONAL USE ONLY.      vitamin D (VITAMIN D3) 1000 units Tab Take 1,000 Units by mouth once daily.      zolpidem (AMBIEN) 10 mg Tab Take 1 tablet (10 mg total) by mouth every evening. 90 tablet 2    [DISCONTINUED] atorvastatin (LIPITOR) 20 MG tablet TAKE 1 TABLET EVERY DAY 90 tablet 3     No facility-administered encounter medications on file as of 7/23/2024.       Allergies:  Review of patient's allergies indicates:  No Known Allergies    Health Maintenance:  Immunization History   Administered Date(s) Administered    COVID-19, MRNA, LN-S, PF (Pfizer) (Gray Cap) 07/18/2022    COVID-19, MRNA, LN-S, PF (Pfizer) (Purple Cap) 02/10/2021, 03/03/2021, 10/04/2021    Influenza 11/14/2005, 09/16/2009, 09/28/2010, 09/26/2017, 09/14/2018    Influenza (FLUAD) - Quadrivalent - Adjuvanted - PF *Preferred* (65+) 10/02/2020, 11/08/2022, 11/08/2023    Influenza (FLUAD) - Trivalent - Adjuvanted - PF (65+) 09/26/2017, 09/14/2018, 10/08/2019    Influenza - Quadrivalent - High Dose - PF (65 years and older) 11/09/2021    Influenza - Quadrivalent - PF *Preferred* (6 months and older) 11/14/2005, 09/16/2009, 09/28/2010    Pneumococcal Conjugate - 13 Valent 05/30/2015    Pneumococcal Polysaccharide - 23 Valent  "09/14/2018    Zoster 10/02/2014    Zoster Recombinant 11/07/2018, 03/11/2019      Health Maintenance   Topic Date Due    TETANUS VACCINE  Never done    Lipid Panel  01/18/2029    Shingles Vaccine  Completed        Physical Exam      Vital Signs  Pulse: (!) 55  SpO2: (!) 90 %  BP: 126/70  BP Location: Left arm  Patient Position: Sitting  Pain Score: 0-No pain  Height and Weight  Height: 5' 7" (170.2 cm)  Weight: 69 kg (152 lb 1.9 oz)  BSA (Calculated - sq m): 1.81 sq meters  BMI (Calculated): 23.8  Weight in (lb) to have BMI = 25: 159.3]    Physical Exam  Vitals reviewed.   Constitutional:       Appearance: He is well-developed.   HENT:      Head: Normocephalic and atraumatic.      Right Ear: External ear normal.      Left Ear: External ear normal.   Cardiovascular:      Rate and Rhythm: Normal rate and regular rhythm.      Heart sounds: Normal heart sounds. No murmur heard.  Pulmonary:      Effort: Pulmonary effort is normal.      Breath sounds: Normal breath sounds. No wheezing or rales.   Abdominal:      General: Bowel sounds are normal.      Palpations: Abdomen is soft.          Laboratory:  CBC:  Recent Labs   Lab 09/05/23  1035   WBC 7.0   Hemoglobin 16.5   Hematocrit 48.8   MCV 87.7   MCH 29.6   MCHC 33.7     CMP:  Recent Labs   Lab 07/18/23  0851 01/18/24  0835 03/26/24  1203   Glucose 95 95  95 82   Calcium 10.0 9.6  9.6 9.9   Albumin 4.0 3.9  3.9 4.4   Total Protein 7.0 7.4  7.4 7.7   Sodium 141 141  141 140   Potassium 5.2 H 5.1  5.1 4.6   CO2 29 32 H  32 H 27   Chloride 103 104  104 100   BUN 17 21  21 13   Alkaline Phosphatase 112 122  122 137 H   ALT 20 34  34 28   AST 24 30  30 30   Total Bilirubin 1.1 H 0.8  0.8 1.0     URINALYSIS:       LIPIDS:  Recent Labs   Lab 07/14/22  0757 07/18/23  0851 01/18/24  0835 03/26/24  1203   TSH  --   --   --  1.367   HDL 41 46 47  47  --    Cholesterol 104 L 107 L 121  121  --    Triglycerides 83 69 82  82  --    LDL Cholesterol 46.4 L 47.2 L 57.6 L  " 57.6 L  --    HDL/Cholesterol Ratio 39.4 43.0 38.8  38.8  --    Non-HDL Cholesterol 63 61 74  74  --    Total Cholesterol/HDL Ratio 2.5 2.3 2.6  2.6  --      TSH:  Recent Labs   Lab 03/26/24  1203   TSH 1.367     A1C:        Assessment/Plan     Rob Mckeon is a 84 y.o.male with:    1. Memory deficit  - Ambulatory referral/consult to Adult Neuropsychology; Future  Referring to neuropsych  2. Essential hypertension  Continue current meds.    3. Hyperlipidemia, mixed  - Comprehensive Metabolic Panel; Future  - Lipid Panel; Future  Continue current meds.    4. Paroxysmal atrial fibrillation  Continue current meds.    5. Elevated PSA  - PSA, TOTAL AND FREE; Future  Update labs  6. Primary insomnia  Continue current meds.      Chronic conditions status updated as per HPI.  Other than changes above, cont current medications and maintain follow up with specialists.  No follow-ups on file.    Future Appointments   Date Time Provider Department Center   7/23/2024  2:30 PM Rc Cabrera MD [facility]       Rc Cabrera MD  [facility]

## 2024-07-24 ENCOUNTER — LAB VISIT (OUTPATIENT)
Dept: LAB | Facility: HOSPITAL | Age: 84
End: 2024-07-24
Attending: INTERNAL MEDICINE
Payer: MEDICARE

## 2024-07-24 DIAGNOSIS — E78.2 HYPERLIPIDEMIA, MIXED: ICD-10-CM

## 2024-07-24 DIAGNOSIS — R97.20 ELEVATED PSA: ICD-10-CM

## 2024-07-24 LAB
ALBUMIN SERPL BCP-MCNC: 3.5 G/DL (ref 3.5–5.2)
ALP SERPL-CCNC: 151 U/L (ref 55–135)
ALT SERPL W/O P-5'-P-CCNC: 34 U/L (ref 10–44)
ANION GAP SERPL CALC-SCNC: 5 MMOL/L (ref 8–16)
AST SERPL-CCNC: 30 U/L (ref 10–40)
BILIRUB SERPL-MCNC: 0.6 MG/DL (ref 0.1–1)
BUN SERPL-MCNC: 14 MG/DL (ref 8–23)
CALCIUM SERPL-MCNC: 9.6 MG/DL (ref 8.7–10.5)
CHLORIDE SERPL-SCNC: 101 MMOL/L (ref 95–110)
CHOLEST SERPL-MCNC: 100 MG/DL (ref 120–199)
CHOLEST/HDLC SERPL: 2.6 {RATIO} (ref 2–5)
CO2 SERPL-SCNC: 32 MMOL/L (ref 23–29)
CREAT SERPL-MCNC: 1 MG/DL (ref 0.5–1.4)
EST. GFR  (NO RACE VARIABLE): >60 ML/MIN/1.73 M^2
GLUCOSE SERPL-MCNC: 95 MG/DL (ref 70–110)
HDLC SERPL-MCNC: 38 MG/DL (ref 40–75)
HDLC SERPL: 38 % (ref 20–50)
LDLC SERPL CALC-MCNC: 49.6 MG/DL (ref 63–159)
NONHDLC SERPL-MCNC: 62 MG/DL
POTASSIUM SERPL-SCNC: 5.3 MMOL/L (ref 3.5–5.1)
PROSTATE SPECIFIC ANTIGEN, TOTAL: 10.8 NG/ML (ref 0–4)
PROT SERPL-MCNC: 6.8 G/DL (ref 6–8.4)
PSA FREE MFR SERPL: 28.33 %
PSA FREE SERPL-MCNC: 3.06 NG/ML (ref 0–1.5)
SODIUM SERPL-SCNC: 138 MMOL/L (ref 136–145)
TRIGL SERPL-MCNC: 62 MG/DL (ref 30–150)

## 2024-07-24 PROCEDURE — 36415 COLL VENOUS BLD VENIPUNCTURE: CPT | Performed by: INTERNAL MEDICINE

## 2024-07-24 PROCEDURE — 84153 ASSAY OF PSA TOTAL: CPT | Performed by: INTERNAL MEDICINE

## 2024-07-24 PROCEDURE — 80053 COMPREHEN METABOLIC PANEL: CPT | Mod: HCNC | Performed by: INTERNAL MEDICINE

## 2024-07-24 PROCEDURE — 80061 LIPID PANEL: CPT | Mod: HCNC | Performed by: INTERNAL MEDICINE

## 2024-07-25 ENCOUNTER — TELEPHONE (OUTPATIENT)
Dept: PRIMARY CARE CLINIC | Facility: CLINIC | Age: 84
End: 2024-07-25
Payer: MEDICARE

## 2024-07-25 DIAGNOSIS — R74.8 ELEVATED ALKALINE PHOSPHATASE LEVEL: ICD-10-CM

## 2024-07-25 DIAGNOSIS — R97.20 ELEVATED PSA: Primary | ICD-10-CM

## 2024-07-25 NOTE — PROGRESS NOTES
PSA is significantly higher than last check and alkaline phosphatase is high as well.  Recommend MRI prostate to look for concerning lesions in the prostate and additional blood test (GGT) to evaluate elevated alkaline phosphatase.

## 2024-07-26 ENCOUNTER — LAB VISIT (OUTPATIENT)
Dept: LAB | Facility: HOSPITAL | Age: 84
End: 2024-07-26
Attending: INTERNAL MEDICINE
Payer: MEDICARE

## 2024-07-26 DIAGNOSIS — R97.20 ELEVATED PSA: ICD-10-CM

## 2024-07-26 DIAGNOSIS — E78.2 HYPERLIPIDEMIA, MIXED: ICD-10-CM

## 2024-07-26 DIAGNOSIS — R74.8 ELEVATED ALKALINE PHOSPHATASE LEVEL: ICD-10-CM

## 2024-07-26 LAB
ALBUMIN SERPL BCP-MCNC: 3.5 G/DL (ref 3.5–5.2)
ALP SERPL-CCNC: 145 U/L (ref 55–135)
ALT SERPL W/O P-5'-P-CCNC: 33 U/L (ref 10–44)
ANION GAP SERPL CALC-SCNC: 6 MMOL/L (ref 8–16)
AST SERPL-CCNC: 29 U/L (ref 10–40)
BILIRUB SERPL-MCNC: 0.5 MG/DL (ref 0.1–1)
BUN SERPL-MCNC: 13 MG/DL (ref 8–23)
CALCIUM SERPL-MCNC: 9.1 MG/DL (ref 8.7–10.5)
CHLORIDE SERPL-SCNC: 102 MMOL/L (ref 95–110)
CHOLEST SERPL-MCNC: 99 MG/DL (ref 120–199)
CHOLEST/HDLC SERPL: 2.5 {RATIO} (ref 2–5)
CO2 SERPL-SCNC: 31 MMOL/L (ref 23–29)
CREAT SERPL-MCNC: 0.9 MG/DL (ref 0.5–1.4)
EST. GFR  (NO RACE VARIABLE): >60 ML/MIN/1.73 M^2
GGT SERPL-CCNC: 50 U/L (ref 8–55)
GLUCOSE SERPL-MCNC: 98 MG/DL (ref 70–110)
HDLC SERPL-MCNC: 39 MG/DL (ref 40–75)
HDLC SERPL: 39.4 % (ref 20–50)
LDLC SERPL CALC-MCNC: 37.2 MG/DL (ref 63–159)
NONHDLC SERPL-MCNC: 60 MG/DL
POTASSIUM SERPL-SCNC: 5.4 MMOL/L (ref 3.5–5.1)
PROSTATE SPECIFIC ANTIGEN, TOTAL: 9.5 NG/ML (ref 0–4)
PROT SERPL-MCNC: 7 G/DL (ref 6–8.4)
PSA FREE MFR SERPL: 32.42 %
PSA FREE SERPL-MCNC: 3.08 NG/ML (ref 0–1.5)
SODIUM SERPL-SCNC: 139 MMOL/L (ref 136–145)
TRIGL SERPL-MCNC: 114 MG/DL (ref 30–150)

## 2024-07-26 PROCEDURE — 36415 COLL VENOUS BLD VENIPUNCTURE: CPT | Performed by: INTERNAL MEDICINE

## 2024-07-26 PROCEDURE — 84154 ASSAY OF PSA FREE: CPT | Mod: HCNC | Performed by: INTERNAL MEDICINE

## 2024-07-26 PROCEDURE — 82977 ASSAY OF GGT: CPT | Mod: HCNC | Performed by: INTERNAL MEDICINE

## 2024-07-26 PROCEDURE — 80061 LIPID PANEL: CPT | Mod: HCNC | Performed by: INTERNAL MEDICINE

## 2024-07-26 PROCEDURE — 84153 ASSAY OF PSA TOTAL: CPT | Mod: HCNC | Performed by: INTERNAL MEDICINE

## 2024-07-26 PROCEDURE — 80053 COMPREHEN METABOLIC PANEL: CPT | Mod: HCNC | Performed by: INTERNAL MEDICINE

## 2024-07-29 NOTE — PROGRESS NOTES
Repeat test shows elevated alkaline phosphatase is not coming from liver.  Proceed with prostate MRI to evaluate elevated PSA and to determine if elevated alk phos is related.

## 2024-08-16 ENCOUNTER — HOSPITAL ENCOUNTER (OUTPATIENT)
Dept: RADIOLOGY | Facility: HOSPITAL | Age: 84
Discharge: HOME | End: 2024-08-16
Attending: INTERNAL MEDICINE
Payer: MEDICARE

## 2024-08-16 DIAGNOSIS — R97.20 ELEVATED PSA: ICD-10-CM

## 2024-08-16 PROCEDURE — 72197 MRI PELVIS W/O & W/DYE: CPT | Mod: 26,,, | Performed by: RADIOLOGY

## 2024-08-16 PROCEDURE — 25500020 PHARM REV CODE 255: Performed by: INTERNAL MEDICINE

## 2024-08-16 PROCEDURE — A9585 GADOBUTROL INJECTION: HCPCS | Performed by: INTERNAL MEDICINE

## 2024-08-16 PROCEDURE — 72197 MRI PELVIS W/O & W/DYE: CPT | Mod: TC

## 2024-08-16 RX ORDER — GADOBUTROL 604.72 MG/ML
10 INJECTION INTRAVENOUS
Status: COMPLETED | OUTPATIENT
Start: 2024-08-16 | End: 2024-08-16

## 2024-08-16 RX ADMIN — GADOBUTROL 10 ML: 604.72 INJECTION INTRAVENOUS at 03:08

## 2024-08-17 RX ORDER — DILTIAZEM HYDROCHLORIDE 120 MG/1
120 CAPSULE, COATED, EXTENDED RELEASE ORAL
Qty: 90 CAPSULE | Refills: 3 | Status: SHIPPED | OUTPATIENT
Start: 2024-08-17

## 2024-08-17 NOTE — TELEPHONE ENCOUNTER
No care due was identified.  [facility] Embedded Care Due Messages. Reference number: [id number].   8/17/2024 1:47:09 AM CDT

## 2024-08-17 NOTE — TELEPHONE ENCOUNTER
Refill Decision Note   Rob Mckeon  is requesting a refill authorization.  Brief Assessment and Rationale for Refill:  Approve     Medication Therapy Plan:         Comments:     Note composed:2:25 PM 08/17/2024

## 2024-08-20 ENCOUNTER — TELEPHONE (OUTPATIENT)
Dept: PRIMARY CARE CLINIC | Facility: CLINIC | Age: 84
End: 2024-08-20
Payer: MEDICARE

## 2024-08-20 NOTE — TELEPHONE ENCOUNTER
----- Message from Brandi Parker sent at 8/20/2024 12:15 PM CDT -----  Contact: [phone]  Calling to get test results.     Name of test (lab, x-ray, etc.):  MRI     Date of test:  08.16.2024    Ordering provider: Dr. Cabrera    Where was the test performed:  [facility]    Would they like to receive a phone call or a response via [facility]?:  call    Additional information:  please call to advise

## 2024-08-23 ENCOUNTER — TELEPHONE (OUTPATIENT)
Dept: PRIMARY CARE CLINIC | Facility: CLINIC | Age: 84
End: 2024-08-23
Payer: MEDICARE

## 2024-08-23 NOTE — TELEPHONE ENCOUNTER
Returned phone call and wife answered, stated he missed call from [facility]. Advised we care not [facility] and no documentation we had tried to contact him.

## 2024-08-23 NOTE — TELEPHONE ENCOUNTER
----- Message from Rupinder Butler sent at 8/23/2024  2:46 PM CDT -----  Type:  Patient Returning Call    Who Called:pt  Who Left Message for Patient:office  Does the patient know what this is regarding?:   Would the patient rather a call back or a response via [facility]? call  Best Call Back Number:[phone]  Additional Information:

## 2024-09-17 DIAGNOSIS — F51.01 PRIMARY INSOMNIA: ICD-10-CM

## 2024-09-17 RX ORDER — ZOLPIDEM TARTRATE 10 MG/1
10 TABLET ORAL NIGHTLY
Qty: 90 TABLET | Refills: 0 | Status: SHIPPED | OUTPATIENT
Start: 2024-09-17

## 2024-09-17 NOTE — TELEPHONE ENCOUNTER
No care due was identified.  [facility] Embedded Care Due Messages. Reference number: [id number].   9/17/2024 8:18:33 AM CDT

## 2024-09-17 NOTE — TELEPHONE ENCOUNTER
----- Message from Chaparrita Park sent at 9/17/2024  8:13 AM CDT -----  Type:  RX Refill Request    Who Called: pt   Refill or New Rx:refill  RX Name and Strength:zolpidem (AMBIEN) 10 mg Tab 90 tablet  Preferred Pharmacy with phone number:[facility] - [facility] [address]  Local or Mail Order:local  Ordering Provider:ashok  Would the patient rather a call back or a response via [facility]? call  Best Call Back Number: [phone]  Additional Information:

## 2024-12-11 DIAGNOSIS — F51.01 PRIMARY INSOMNIA: ICD-10-CM

## 2024-12-11 RX ORDER — ZOLPIDEM TARTRATE 10 MG/1
10 TABLET ORAL NIGHTLY
Qty: 90 TABLET | Refills: 0 | Status: SHIPPED | OUTPATIENT
Start: 2024-12-11

## 2024-12-11 NOTE — TELEPHONE ENCOUNTER
No care due was identified.  Health [facility] Embedded Care Due Messages. Reference number: [id number].   12/11/2024 10:02:00 AM CST

## 2024-12-11 NOTE — TELEPHONE ENCOUNTER
----- Message from Karrie sent at 12/11/2024  9:41 AM CST -----  Type:  RX Refill Request    Who Called: Pt wife  Refill or New Rx:refill  RX Name and Strength:  zolpidem (AMBIEN) 10 mg Tab 90 tablet 0 9/17/2024 -  Sig: Take 1 tablet (10 mg total) by mouth every evening.    Preferred Pharmacy with phone number:  [facility] - [place], [place] - [address]  [address] [address]  Phone: [phone] Fax: [phone]    Best Call Back Number:[phone]    Additional :  Wife would also know if he can take Magnesium Glycinate 200mg 2x a day along with his medications.

## 2024-12-15 DIAGNOSIS — E78.2 HYPERLIPIDEMIA, MIXED: ICD-10-CM

## 2024-12-15 NOTE — TELEPHONE ENCOUNTER
No care due was identified.  Health [facility] Embedded Care Due Messages. Reference number: [id number].   12/15/2024 12:42:58 PM CST

## 2024-12-16 RX ORDER — ATORVASTATIN CALCIUM 20 MG/1
TABLET, FILM COATED ORAL
Qty: 90 TABLET | Refills: 2 | Status: SHIPPED | OUTPATIENT
Start: 2024-12-16

## 2024-12-16 NOTE — TELEPHONE ENCOUNTER
Refill Decision Note   Rob Mckeon  is requesting a refill authorization.  Brief Assessment and Rationale for Refill:  Approve     Medication Therapy Plan:         Comments:     Note composed:3:18 PM 12/16/2024

## 2024-12-30 ENCOUNTER — TELEPHONE (OUTPATIENT)
Dept: PRIMARY CARE CLINIC | Facility: CLINIC | Age: 84
End: 2024-12-30
Payer: MEDICARE

## 2024-12-30 NOTE — TELEPHONE ENCOUNTER
----- Message from Bernardino sent at 12/30/2024 11:59 AM CST -----  Contact: Spouse  Type:  Appointment Request    Caller is requesting a sooner appointment.  Caller declined first available appointment listed below.  Caller will not accept being placed on the waitlist and is requesting a message be sent to doctor.  Name of Caller:Spouse   When is the first available appointment?01/25; after April 2025  Symptoms:Coughing up mucus   Would the patient rather a call back or a response via [facility]? call  Best Call Back Number:[phone]  Additional Information:   Patient Navigator: Schedule patient appointment within 24 hour time frame. If unable to schedule, send inbasket message with appointment request to PCP pool within time frame.

## 2024-12-31 ENCOUNTER — TELEPHONE (OUTPATIENT)
Dept: PRIMARY CARE CLINIC | Facility: CLINIC | Age: 84
End: 2024-12-31
Payer: MEDICARE

## 2024-12-31 NOTE — TELEPHONE ENCOUNTER
----- Message from Tino sent at 12/31/2024  8:05 AM CST -----  Contact: PT WIFE  Type:  Sooner Apoointment Request    Caller is requesting a sooner appointment.  Caller declined first available appointment listed below.  Caller will not accept being placed on the waitlist and is requesting a message be sent to doctor.  Name of Caller:pt wife   When is the first available appointment?   Symptoms:cough/ congestion. Does not want to go to   Would the patient rather a call back or a response via [facility]? Call   Best Call Back Number:[phone]   Additional Information: would like to see anyone available at [facility]. Denied offers to go anywhere else.

## 2024-12-31 NOTE — TELEPHONE ENCOUNTER
Spoke to pt's wife. Did not go to  yesterday because they were so busy. Advised to try again and to call first. Given numbers for two different ones in the area.

## 2025-01-13 ENCOUNTER — TELEPHONE (OUTPATIENT)
Dept: PRIMARY CARE CLINIC | Facility: CLINIC | Age: 85
End: 2025-01-13
Payer: MEDICARE

## 2025-01-13 DIAGNOSIS — R05.3 CHRONIC COUGH: Primary | ICD-10-CM

## 2025-01-13 NOTE — TELEPHONE ENCOUNTER
----- Message from Eugene sent at 1/13/2025  9:50 AM CST -----  Type:  Needs Medical Advice    Who Called: wife   Symptoms (please be specific): Symptom: Cough  Outcome: Talk to a nurse or provider within 15 minutes.  Reason: Wheezing (high-pitched whistling sound)   How long has patient had these symptoms:  2 weeks   Would the patient rather a call back or a response via [facility]? Call   Best Call Back Number:    Additional Information: caller declined appt 01/21  Requesting to get an chest xray today     Caller stated he went to in and out urgent care and he was diagnosed with acute sinus infection he was prescribed anabiotics and cough medication and he is still ill (they did not do xray)  Only performed flu,covid and another one she forgot and it came back negative  He received 2 injections there as well

## 2025-01-14 ENCOUNTER — TELEPHONE (OUTPATIENT)
Dept: PRIMARY CARE CLINIC | Facility: CLINIC | Age: 85
End: 2025-01-14
Payer: MEDICARE

## 2025-01-14 DIAGNOSIS — Z00.00 ENCOUNTER FOR MEDICARE ANNUAL WELLNESS EXAM: ICD-10-CM

## 2025-01-14 NOTE — TELEPHONE ENCOUNTER
----- Message from Twyla sent at 1/14/2025  3:31 PM CST -----  Contact: Wife/Mary/[phone]  Patient is returning a phone call.    Who left a message for the patient: ?    Does patient know what this is regarding:      Would you like a call back, or a response through your [facility] portal?:   Call back     Comments:

## 2025-01-15 ENCOUNTER — HOSPITAL ENCOUNTER (OUTPATIENT)
Dept: RADIOLOGY | Facility: HOSPITAL | Age: 85
Discharge: HOME | End: 2025-01-15
Attending: INTERNAL MEDICINE
Payer: MEDICARE

## 2025-01-15 DIAGNOSIS — R05.3 CHRONIC COUGH: ICD-10-CM

## 2025-01-15 PROCEDURE — 71046 X-RAY EXAM CHEST 2 VIEWS: CPT | Mod: TC

## 2025-01-15 PROCEDURE — 71046 X-RAY EXAM CHEST 2 VIEWS: CPT | Mod: 26,,, | Performed by: RADIOLOGY

## 2025-01-17 ENCOUNTER — OFFICE VISIT (OUTPATIENT)
Dept: PRIMARY CARE CLINIC | Facility: CLINIC | Age: 85
End: 2025-01-17
Payer: MEDICARE

## 2025-01-17 DIAGNOSIS — H35.3221 EXUDATIVE AGE-RELATED MACULAR DEGENERATION, LEFT EYE, WITH ACTIVE CHOROIDAL NEOVASCULARIZATION: ICD-10-CM

## 2025-01-17 DIAGNOSIS — R05.3 CHRONIC COUGH: Primary | ICD-10-CM

## 2025-01-17 DIAGNOSIS — I48.0 PAROXYSMAL ATRIAL FIBRILLATION: ICD-10-CM

## 2025-01-17 DIAGNOSIS — J84.9 INTERSTITIAL PULMONARY DISEASE, UNSPECIFIED: ICD-10-CM

## 2025-01-17 DIAGNOSIS — J40 BRONCHITIS: ICD-10-CM

## 2025-01-17 DIAGNOSIS — J06.9 UPPER RESPIRATORY TRACT INFECTION, UNSPECIFIED TYPE: ICD-10-CM

## 2025-01-17 PROCEDURE — 1160F RVW MEDS BY RX/DR IN RCRD: CPT | Mod: CPTII,S$GLB,, | Performed by: NURSE PRACTITIONER

## 2025-01-17 PROCEDURE — 99999 PR PBB SHADOW E&M-EST. PATIENT-LVL III: CPT | Mod: PBBFAC,,, | Performed by: NURSE PRACTITIONER

## 2025-01-17 PROCEDURE — 99214 OFFICE O/P EST MOD 30 MIN: CPT | Mod: S$GLB,,, | Performed by: NURSE PRACTITIONER

## 2025-01-17 PROCEDURE — 1159F MED LIST DOCD IN RCRD: CPT | Mod: CPTII,S$GLB,, | Performed by: NURSE PRACTITIONER

## 2025-01-17 RX ORDER — AMOXICILLIN AND CLAVULANATE POTASSIUM 875; 125 MG/1; MG/1
1 TABLET, FILM COATED ORAL 2 TIMES DAILY
Qty: 14 TABLET | Refills: 0 | Status: SHIPPED | OUTPATIENT
Start: 2025-01-17 | End: 2025-01-24

## 2025-01-17 RX ORDER — FLUTICASONE FUROATE AND VILANTEROL 100; 25 UG/1; UG/1
1 POWDER RESPIRATORY (INHALATION) DAILY
Qty: 14 EACH | Refills: 0 | Status: SHIPPED | OUTPATIENT
Start: 2025-01-17 | End: 2025-01-29

## 2025-01-17 RX ORDER — FLUTICASONE PROPIONATE 50 MCG
1 SPRAY, SUSPENSION (ML) NASAL 2 TIMES DAILY
Qty: 16 G | Refills: 3 | Status: SHIPPED | OUTPATIENT
Start: 2025-01-17 | End: 2025-01-17 | Stop reason: CLARIF

## 2025-01-17 NOTE — PROGRESS NOTES
[facility] Note    Chief Complaint      Chief Complaint   Patient presents with    Wheezing       History of Present Illness      Rob Mckeon is a 84 y.o. male with chronic conditions of allergic rhinitis, interstitial pulmonary disease, htn, hld, afib, insomnia who presents today for: pt complaining of productive cough since . Went to urgent care was given z-pack, steroid shot, and cough syrup. No fever or chills. Taking zyrtec and nasal spray for sinuses, minimal improvement. Still having frequent cough.   Negative for flu, covid, strep.     Past Medical History:  Past Medical History:   Diagnosis Date    Pericarditis        Past Surgical History:   has a past surgical history that includes Cholecystectomy and Hernia repair.    Family History:  family history includes No Known Problems in his father and mother.     Social History:  Social History     Tobacco Use    Smoking status: Former    Smokeless tobacco: Never   Substance Use Topics    Alcohol use: Not Currently       Review of Systems   Constitutional:  Negative for chills and fever.   Respiratory:  Positive for cough. Negative for shortness of breath.    Cardiovascular:  Negative for chest pain and palpitations.   Gastrointestinal:  Negative for constipation, diarrhea, nausea and vomiting.   Genitourinary:  Negative for dysuria and hematuria.   Musculoskeletal:  Negative for falls.   Neurological:  Negative for headaches.        Medications:  Outpatient Encounter Medications as of 2025   Medication Sig Dispense Refill    [] amoxicillin-clavulanate 875-125mg (AUGMENTIN) 875-125 mg per tablet Take 1 tablet by mouth 2 (two) times daily. for 7 days 14 tablet 0    aspirin (ECOTRIN) 81 MG EC tablet Take 81 mg by mouth.      atorvastatin (LIPITOR) 20 MG tablet TAKE 1 TABLET EVERY DAY 90 tablet 2    cetirizine (ZYRTEC) 10 MG tablet Take 10 mg by mouth.      cholecalciferol, vitamin D3, 10 mcg (400 unit) Cap Take by mouth.       cholecalciferol, vitamin D3, 400 unit Cap Take 400 Int'l Units by mouth.      diltiaZEM (CARDIZEM CD) 120 MG Cp24 TAKE 1 CAPSULE EVERY DAY 90 capsule 3    fluticasone furoate-vilanteroL (BREO ELLIPTA) 100-25 mcg/dose diskus inhaler Inhale 1 puff into the lungs once daily. Controller 14 each 0    INV zimura 20 mg/mL injection 2 mg by Intravitreal route every 30 days. FOR INVESTIGATIONAL USE ONLY.      vitamin D (VITAMIN D3) 1000 units Tab Take 1,000 Units by mouth once daily.      zolpidem (AMBIEN) 10 mg Tab Take 1 tablet (10 mg total) by mouth every evening. 90 tablet 0    [DISCONTINUED] fluticasone furoate-vilanteroL (BREO ELLIPTA) 100-25 mcg/dose diskus inhaler Inhale 1 puff into the lungs once daily. Controller 14 each 3    [DISCONTINUED] fluticasone propionate (FLONASE) 50 mcg/actuation nasal spray 1 spray (50 mcg total) by Each Nostril route 2 (two) times a day. 16 g 3    [DISCONTINUED] fluticasone propionate (FLONASE) 50 mcg/actuation nasal spray 1 spray (50 mcg total) by Each Nostril route 2 (two) times a day. 16 g 3     No facility-administered encounter medications on file as of 1/17/2025.       Allergies:  Review of patient's allergies indicates:  No Known Allergies    Health Maintenance:  Immunization History   Administered Date(s) Administered    COVID-19, MRNA, LN-S, PF (Pfizer) (Gray Cap) 07/18/2022    COVID-19, MRNA, LN-S, PF (Pfizer) (Purple Cap) 02/10/2021, 03/03/2021, 10/04/2021    Influenza 11/14/2005, 09/16/2009, 09/28/2010, 09/26/2017, 09/14/2018    Influenza (FLUAD) - Quadrivalent - Adjuvanted - PF *Preferred* (65+) 10/02/2020, 11/08/2022, 11/08/2023    Influenza - Quadrivalent - High Dose - PF (65 years and older) 11/09/2021    Influenza - Quadrivalent - PF *Preferred* (6 months and older) 11/14/2005, 09/16/2009, 09/28/2010    Influenza - Trivalent - Fluad - Adjuvanted - PF (65 years and older 09/26/2017, 09/14/2018, 10/08/2019    Pneumococcal Conjugate - 13 Valent 05/30/2015    Pneumococcal  Polysaccharide - 23 Valent 09/14/2018    Zoster 10/02/2014    Zoster Recombinant 11/07/2018, 03/11/2019      Health Maintenance   Topic Date Due    TETANUS VACCINE  Never done    RSV Vaccine (Age 60+ and Pregnant patients) (1 - 1-dose 75+ series) Never done    COVID-19 Vaccine (5 - 2024-25 season) 09/01/2024    Lipid Panel  07/26/2029    Shingles Vaccine  Completed    Influenza Vaccine  Completed    Pneumococcal Vaccines (Age 50+)  Completed        Physical Exam      Vital Signs  Pulse: (!) 58  SpO2: (!) 93 %  BP: (!) 150/78  BP Location: Left arm  Height and Weight  Weight: 69.5 kg (153 lb 3.5 oz)]    Physical Exam  Constitutional:       Appearance: He is well-developed.   HENT:      Head: Normocephalic and atraumatic.   Neck:      Thyroid: No thyromegaly.   Cardiovascular:      Rate and Rhythm: Normal rate and regular rhythm.      Heart sounds: No murmur heard.  Pulmonary:      Effort: Pulmonary effort is normal. No respiratory distress.      Breath sounds: Normal breath sounds.   Abdominal:      General: There is no distension.      Palpations: Abdomen is soft.      Tenderness: There is no abdominal tenderness.   Skin:     General: Skin is warm and dry.   Neurological:      Mental Status: He is alert and oriented to person, place, and time.   Psychiatric:         Behavior: Behavior normal.          Laboratory:  CBC:  Recent Labs   Lab 09/05/23  1035   WBC 7.0   Hemoglobin 16.5   Hematocrit 48.8   MCV 87.7   MCH 29.6   MCHC 33.7     CMP:  Recent Labs   Lab 03/26/24  1203 07/24/24  0935 07/26/24  1209   Glucose 82 95 98   Calcium 9.9 9.6 9.1   Albumin 4.4 3.5 3.5   Total Protein 7.7 6.8 7.0   Sodium 140 138 139   Potassium 4.6 5.3 H 5.4 H   CO2 27 32 H 31 H   Chloride 100 101 102   BUN 13 14 13   Alkaline Phosphatase 137 H 151 H 145 H   ALT 28 34 33   AST 30 30 29   Total Bilirubin 1.0 0.6 0.5     URINALYSIS:  Recent Labs   Lab 09/13/24  1528   Color, UA Yellow   Leukocytes, UA 1+ A   Urobilinogen, UA 1.0       LIPIDS:  Recent Labs   Lab 01/18/24  0835 03/26/24  1203 07/24/24  0935 07/26/24  1209   TSH  --  1.367  --   --    HDL 47  47  --  38 L 39 L   Cholesterol 121  121  --  100 L 99 L   Triglycerides 82  82  --  62 114   LDL Cholesterol 57.6 L  57.6 L  --  49.6 L 37.2 L   HDL/Cholesterol Ratio 38.8  38.8  --  38.0 39.4   Non-HDL Cholesterol 74  74  --  62 60   Total Cholesterol/HDL Ratio 2.6  2.6  --  2.6 2.5     TSH:  Recent Labs   Lab 03/26/24  1203   TSH 1.367     A1C:        Assessment/Plan     Rob Mckeon is a 84 y.o.male with:    1. Chronic cough  - fluticasone furoate-vilanteroL (BREO ELLIPTA) 100-25 mcg/dose diskus inhaler; Inhale 1 puff into the lungs once daily. Controller  Dispense: 14 each; Refill: 0    2. Upper respiratory tract infection, unspecified type    3. Interstitial pulmonary disease, unspecified  - amoxicillin-clavulanate 875-125mg (AUGMENTIN) 875-125 mg per tablet; Take 1 tablet by mouth 2 (two) times daily. for 7 days  Dispense: 14 tablet; Refill: 0    4. Paroxysmal atrial fibrillation  Stable. Continue current meds.     5. Exudative age-related macular degeneration, left eye, with active choroidal neovascularization    6. Bronchitis  - fluticasone furoate-vilanteroL (BREO ELLIPTA) 100-25 mcg/dose diskus inhaler; Inhale 1 puff into the lungs once daily. Controller  Dispense: 14 each; Refill: 0  - amoxicillin-clavulanate 875-125mg (AUGMENTIN) 875-125 mg per tablet; Take 1 tablet by mouth 2 (two) times daily. for 7 days  Dispense: 14 tablet; Refill: 0   - if no improvement would recommend CT of chest.    Chronic conditions status updated as per HPI.  Other than changes above, cont current medications and maintain follow up with specialists.  No follow-ups on file.    Future Appointments   Date Time Provider Department Center   1/29/2025  1:45 PM Rc [facility] [place] Adrienne Cotaya, FNP [facility]

## 2025-01-27 VITALS
WEIGHT: 153.25 LBS | HEART RATE: 58 BPM | OXYGEN SATURATION: 93 % | SYSTOLIC BLOOD PRESSURE: 150 MMHG | BODY MASS INDEX: 24 KG/M2 | DIASTOLIC BLOOD PRESSURE: 78 MMHG

## 2025-01-29 ENCOUNTER — OFFICE VISIT (OUTPATIENT)
Dept: PRIMARY CARE CLINIC | Facility: CLINIC | Age: 85
End: 2025-01-29
Payer: MEDICARE

## 2025-01-29 VITALS
DIASTOLIC BLOOD PRESSURE: 70 MMHG | BODY MASS INDEX: 24.19 KG/M2 | OXYGEN SATURATION: 90 % | WEIGHT: 154.13 LBS | SYSTOLIC BLOOD PRESSURE: 130 MMHG | HEART RATE: 74 BPM | HEIGHT: 67 IN

## 2025-01-29 DIAGNOSIS — R97.20 ELEVATED PSA: ICD-10-CM

## 2025-01-29 DIAGNOSIS — I10 ESSENTIAL HYPERTENSION: ICD-10-CM

## 2025-01-29 DIAGNOSIS — J30.9 ALLERGIC RHINITIS, UNSPECIFIED SEASONALITY, UNSPECIFIED TRIGGER: ICD-10-CM

## 2025-01-29 DIAGNOSIS — I48.0 PAROXYSMAL ATRIAL FIBRILLATION: Primary | ICD-10-CM

## 2025-01-29 DIAGNOSIS — F51.01 PRIMARY INSOMNIA: ICD-10-CM

## 2025-01-29 DIAGNOSIS — R05.9 COUGH, UNSPECIFIED TYPE: ICD-10-CM

## 2025-01-29 DIAGNOSIS — E78.2 HYPERLIPIDEMIA, MIXED: ICD-10-CM

## 2025-01-29 DIAGNOSIS — J84.9 INTERSTITIAL PULMONARY DISEASE, UNSPECIFIED: ICD-10-CM

## 2025-01-29 RX ORDER — FLUTICASONE PROPIONATE 50 MCG
1 SPRAY, SUSPENSION (ML) NASAL 2 TIMES DAILY
Qty: 16 G | Refills: 3 | Status: SHIPPED | OUTPATIENT
Start: 2025-01-29

## 2025-01-29 RX ORDER — FLUTICASONE PROPIONATE AND SALMETEROL 250; 50 UG/1; UG/1
1 POWDER RESPIRATORY (INHALATION) 2 TIMES DAILY
Qty: 60 EACH | Refills: 0 | Status: SHIPPED | OUTPATIENT
Start: 2025-01-29 | End: 2026-01-29

## 2025-01-29 NOTE — PROGRESS NOTES
[facility] Note    Chief Complaint      Chief Complaint   Patient presents with    Follow-up     6 month       History of Present Illness      History of Present Illness            A fib: Sees Dr. Cavazos.  On Eliquis for anticoagulation. Diltiazem for rate control.  Denies chest pain, shortness of breath, palpitations.  HTN: BP at goal on diltiazem.  HLD: Controlled on lipitor.  LDL 46.  Insomnia: Controlled on ambien.  No new complaints.  Elevated PSA: PSA 6.8 last check.  No new or worsening obstructive symptoms.    Allergic rhinitis: Controlled with mucinex as needed and zyrtec daily.   Flu shot UTD.  Prevnar, pneumovax UTD.  COVID vaccine UTD.  Shingrix vaccine UTD.  Cscope Dr. Toscano, unsure when, will get record. Hx of polyp, 5 yr interval.     Assessment/Plan     Rob Mckeon is a 85 y.o.male with:    Assessment & Plan              1. Paroxysmal atrial fibrillation  Continue current meds.  F/U with cardiology.   2. Essential hypertension  Continue current meds.    3. Hyperlipidemia, mixed  Continue current meds.    4. Interstitial pulmonary disease, unspecified  Continue current meds.  Check CT chest  5. Primary insomnia  Continue current meds.    6. Elevated PSA  Cont to monitor.  7. Cough, unspecified type  - CT Chest Without Contrast; Future  - fluticasone-salmeterol diskus inhaler 250-50 mcg; Inhale 1 puff into the lungs 2 (two) times daily. Controller  Dispense: 60 each; Refill: 0    8. Allergic rhinitis, unspecified seasonality, unspecified trigger  - fluticasone propionate (FLONASE) 50 mcg/actuation nasal spray; 1 spray (50 mcg total) by Each Nostril route 2 (two) times a day.  Dispense: 16 g; Refill: 3      Chronic conditions status updated as per HPI.  Other than changes above, cont current medications and maintain follow up with specialists.  No follow-ups on file.    Future Appointments   Date Time Provider Department Center   2/24/2025  3:20 PM Brandon Kim MD [facility]   2025  2:00 PM Rc Cabrera MD [facility]           Past Medical History:  Past Medical History:   Diagnosis Date    Pericarditis        Past Surgical History:   has a past surgical history that includes Cholecystectomy and Hernia repair.    Family History:  family history includes No Known Problems in his father and mother.     Social History:  Social History     Tobacco Use    Smoking status: Former    Smokeless tobacco: Never   Substance Use Topics    Alcohol use: Not Currently       Medications:  Outpatient Encounter Medications as of 2025   Medication Sig Dispense Refill    [] amoxicillin-clavulanate 875-125mg (AUGMENTIN) 875-125 mg per tablet Take 1 tablet by mouth 2 (two) times daily. for 7 days 14 tablet 0    aspirin (ECOTRIN) 81 MG EC tablet Take 81 mg by mouth.      atorvastatin (LIPITOR) 20 MG tablet TAKE 1 TABLET EVERY DAY 90 tablet 2    cetirizine (ZYRTEC) 10 MG tablet Take 10 mg by mouth.      cholecalciferol, vitamin D3, 10 mcg (400 unit) Cap Take by mouth.      cholecalciferol, vitamin D3, 400 unit Cap Take 400 Int'l Units by mouth.      diltiaZEM (CARDIZEM CD) 120 MG Cp24 TAKE 1 CAPSULE EVERY DAY 90 capsule 3    fluticasone propionate (FLONASE) 50 mcg/actuation nasal spray 1 spray (50 mcg total) by Each Nostril route 2 (two) times a day. 16 g 3    fluticasone-salmeterol diskus inhaler 250-50 mcg Inhale 1 puff into the lungs 2 (two) times daily. Controller 60 each 0    INV zimura 20 mg/mL injection 2 mg by Intravitreal route every 30 days. FOR INVESTIGATIONAL USE ONLY.      vitamin D (VITAMIN D3) 1000 units Tab Take 1,000 Units by mouth once daily.      zolpidem (AMBIEN) 10 mg Tab Take 1 tablet (10 mg total) by mouth every evening. 90 tablet 0    [DISCONTINUED] fluticasone furoate-vilanteroL (BREO ELLIPTA) 100-25 mcg/dose diskus inhaler Inhale 1 puff into the lungs once daily. Controller 14 each 3    [DISCONTINUED] fluticasone furoate-vilanteroL  "(BREO ELLIPTA) 100-25 mcg/dose diskus inhaler Inhale 1 puff into the lungs once daily. Controller 14 each 0    [DISCONTINUED] fluticasone propionate (FLONASE) 50 mcg/actuation nasal spray 1 spray (50 mcg total) by Each Nostril route 2 (two) times a day. 16 g 3     No facility-administered encounter medications on file as of 1/29/2025.       Allergies:  Review of patient's allergies indicates:  No Known Allergies    Health Maintenance:  Immunization History   Administered Date(s) Administered    COVID-19, MRNA, LN-S, PF (Pfizer) (Gray Cap) 07/18/2022    COVID-19, MRNA, LN-S, PF (Pfizer) (Purple Cap) 02/10/2021, 03/03/2021, 10/04/2021    Influenza 11/14/2005, 09/16/2009, 09/28/2010, 09/26/2017, 09/14/2018    Influenza (FLUAD) - Quadrivalent - Adjuvanted - PF *Preferred* (65+) 10/02/2020, 11/08/2022, 11/08/2023    Influenza - Quadrivalent - High Dose - PF (65 years and older) 11/09/2021    Influenza - Quadrivalent - PF *Preferred* (6 months and older) 11/14/2005, 09/16/2009, 09/28/2010    Influenza - Trivalent - Fluad - Adjuvanted - PF (65 years and older 09/26/2017, 09/14/2018, 10/08/2019    Influenza - Trivalent - Fluzone High Dose - PF (65 years and older) 11/15/2024    Pneumococcal Conjugate - 13 Valent 05/30/2015    Pneumococcal Polysaccharide - 23 Valent 09/14/2018    Zoster 10/02/2014    Zoster Recombinant 11/07/2018, 03/11/2019      Health Maintenance   Topic Date Due    TETANUS VACCINE  Never done    RSV Vaccine (Age 60+ and Pregnant patients) (1 - 1-dose 75+ series) Never done    COVID-19 Vaccine (5 - 2024-25 season) 09/01/2024    Lipid Panel  07/26/2029    Shingles Vaccine  Completed    Influenza Vaccine  Completed    Pneumococcal Vaccines (Age 50+)  Completed        Physical Exam      Vital Signs  Pulse: 74  SpO2: (!) 90 %  BP: 130/70  BP Location: Right arm  Patient Position: Sitting  Pain Score: 0-No pain  Height and Weight  Height: 5' 7" (170.2 cm)  Weight: 69.9 kg (154 lb 1.6 oz)  BSA (Calculated - sq " m): 1.82 sq meters  BMI (Calculated): 24.1  Weight in (lb) to have BMI = 25: 159.3]    Physical Exam              Physical Exam  Vitals reviewed.   Constitutional:       Appearance: He is well-developed.   HENT:      Head: Normocephalic and atraumatic.      Right Ear: External ear normal.      Left Ear: External ear normal.   Cardiovascular:      Rate and Rhythm: Normal rate and regular rhythm.      Heart sounds: Normal heart sounds. No murmur heard.  Pulmonary:      Effort: Pulmonary effort is normal.      Breath sounds: Normal breath sounds. No wheezing or rales.   Abdominal:      General: Bowel sounds are normal.      Palpations: Abdomen is soft.         Laboratory:    Results              CBC:  Recent Labs   Lab 09/05/23  1035   WBC 7.0   Hemoglobin 16.5   Hematocrit 48.8   MCV 87.7   MCH 29.6   MCHC 33.7     CMP:  Recent Labs   Lab 07/26/24  1209 09/13/24  1202 09/14/24  0517   Glucose 98  --   --    Calcium 9.1 9.2 9.3   Albumin 3.5 3.7 3.6   Total Protein 7.0  --   --    Sodium 139 137 141   Potassium 5.4 H 4.0 4.5   CO2 31 H  --   --    Carbon Dioxide  --  27 31   Chloride 102  --   --    BUN 13 19.0 17.0   Alkaline Phosphatase 145 H  --   --    ALT 33 21 20   AST 29 27 25   Total Bilirubin 0.5 1.4 H 1.0     URINALYSIS:  Recent Labs   Lab 09/13/24  1528   Color, UA Yellow   Leukocytes, UA 1+ A   Urobilinogen, UA 1.0      LIPIDS:  Recent Labs   Lab 01/18/24  0835 03/26/24  1203 07/24/24  0935 07/26/24  1209   TSH  --  1.367  --   --    HDL 47  47  --  38 L 39 L   Cholesterol 121  121  --  100 L 99 L   Triglycerides 82  82  --  62 114   LDL Cholesterol 57.6 L  57.6 L  --  49.6 L 37.2 L   HDL/Cholesterol Ratio 38.8  38.8  --  38.0 39.4   Non-HDL Cholesterol 74  74  --  62 60   Total Cholesterol/HDL Ratio 2.6  2.6  --  2.6 2.5     TSH:  Recent Labs   Lab 03/26/24  1203   TSH 1.367     A1C:            This note was generated with the assistance of ambient listening technology. Verbal consent was obtained  by the patient and accompanying visitor(s) for the recording of patient appointment to facilitate this note. I attest to having reviewed and edited the generated note for accuracy, though some syntax or spelling errors may persist. Please contact the author of this note for any clarification.      Rc Cabrera MD  [facility]

## 2025-02-04 ENCOUNTER — HOSPITAL ENCOUNTER (OUTPATIENT)
Dept: RADIOLOGY | Facility: HOSPITAL | Age: 85
Discharge: HOME | End: 2025-02-04
Attending: INTERNAL MEDICINE
Payer: MEDICARE

## 2025-02-04 DIAGNOSIS — R05.9 COUGH, UNSPECIFIED TYPE: ICD-10-CM

## 2025-02-04 PROCEDURE — 71250 CT THORAX DX C-: CPT | Mod: TC

## 2025-02-04 PROCEDURE — 71250 CT THORAX DX C-: CPT | Mod: 26,,, | Performed by: STUDENT IN AN ORGANIZED HEALTH CARE EDUCATION/TRAINING PROGRAM

## 2025-02-07 ENCOUNTER — TELEPHONE (OUTPATIENT)
Dept: PRIMARY CARE CLINIC | Facility: CLINIC | Age: 85
End: 2025-02-07
Payer: MEDICARE

## 2025-02-07 NOTE — TELEPHONE ENCOUNTER
----- Message from Anushka sent at 2/7/2025 10:13 AM CST -----  Regarding: Call back  Contact: [phone]  TEST RESULTS:   Patient would like to get test results.  Name of test (lab, mammo, etc.): CT-Scan Chest   Date of test: 2/4/25

## 2025-02-09 ENCOUNTER — TELEPHONE (OUTPATIENT)
Dept: PRIMARY CARE CLINIC | Facility: CLINIC | Age: 85
End: 2025-02-09
Payer: MEDICARE

## 2025-02-09 DIAGNOSIS — J43.9 PULMONARY EMPHYSEMA, UNSPECIFIED EMPHYSEMA TYPE: ICD-10-CM

## 2025-02-09 DIAGNOSIS — J84.9 INTERSTITIAL PULMONARY DISEASE, UNSPECIFIED: Primary | ICD-10-CM

## 2025-02-09 NOTE — TELEPHONE ENCOUNTER
----- Message from Geneva sent at 2/7/2025  4:27 PM CST -----  Type:  Test Results    Who Called: pt's wife   Name of Test (Lab/Mammo/Etc): CT   Date of Test: 02/04  Ordering Provider:  Deborah   Where the test was performed:  [facility]  Would the patient rather a call back or a response via [facility]? Call   Best Call Back Number: [phone]   Additional Information:

## 2025-02-10 ENCOUNTER — TELEPHONE (OUTPATIENT)
Dept: PRIMARY CARE CLINIC | Facility: CLINIC | Age: 85
End: 2025-02-10
Payer: MEDICARE

## 2025-02-10 NOTE — TELEPHONE ENCOUNTER
Spoke with wife and advised of the results     Can you please assist with referral to [facility] if available

## 2025-02-10 NOTE — TELEPHONE ENCOUNTER
CT chest shows severe emphysema and mucus plugging.  Recommend seeing pulmonology to further evaluate

## 2025-02-10 NOTE — TELEPHONE ENCOUNTER
----- Message from Erika sent at 2/10/2025 11:40 AM CST -----  Type:  Test Results    Who Called: Wife  Name of Test (Lab/Mammo/Etc): CT Chest   Date of Test: 02/04  Ordering Provider: Deborah   Where the test was performed: [facility]  Would the patient rather a call back or a response via [facility]? Call back   Best Call Back Number:  [phone]   Additional Information:

## 2025-02-24 ENCOUNTER — OFFICE VISIT (OUTPATIENT)
Dept: PULMONOLOGY | Facility: CLINIC | Age: 85
End: 2025-02-24
Payer: MEDICARE

## 2025-02-24 VITALS
HEIGHT: 67 IN | DIASTOLIC BLOOD PRESSURE: 79 MMHG | HEART RATE: 66 BPM | BODY MASS INDEX: 24.53 KG/M2 | OXYGEN SATURATION: 92 % | SYSTOLIC BLOOD PRESSURE: 141 MMHG | WEIGHT: 156.31 LBS

## 2025-02-24 DIAGNOSIS — J43.9 PULMONARY EMPHYSEMA, UNSPECIFIED EMPHYSEMA TYPE: ICD-10-CM

## 2025-02-24 DIAGNOSIS — J84.9 INTERSTITIAL PULMONARY DISEASE, UNSPECIFIED: ICD-10-CM

## 2025-02-24 PROCEDURE — 99214 OFFICE O/P EST MOD 30 MIN: CPT | Mod: S$GLB,,, | Performed by: INTERNAL MEDICINE

## 2025-02-24 PROCEDURE — 99999 PR PBB SHADOW E&M-EST. PATIENT-LVL III: CPT | Mod: PBBFAC,,, | Performed by: INTERNAL MEDICINE

## 2025-02-24 PROCEDURE — 3288F FALL RISK ASSESSMENT DOCD: CPT | Mod: CPTII,S$GLB,, | Performed by: INTERNAL MEDICINE

## 2025-02-24 PROCEDURE — 1126F AMNT PAIN NOTED NONE PRSNT: CPT | Mod: CPTII,S$GLB,, | Performed by: INTERNAL MEDICINE

## 2025-02-24 PROCEDURE — 1159F MED LIST DOCD IN RCRD: CPT | Mod: CPTII,S$GLB,, | Performed by: INTERNAL MEDICINE

## 2025-02-24 PROCEDURE — G2211 COMPLEX E/M VISIT ADD ON: HCPCS | Mod: S$GLB,,, | Performed by: INTERNAL MEDICINE

## 2025-02-24 PROCEDURE — 1101F PT FALLS ASSESS-DOCD LE1/YR: CPT | Mod: CPTII,S$GLB,, | Performed by: INTERNAL MEDICINE

## 2025-02-24 RX ORDER — FLUTICASONE FUROATE, UMECLIDINIUM BROMIDE AND VILANTEROL TRIFENATATE 100; 62.5; 25 UG/1; UG/1; UG/1
1 POWDER RESPIRATORY (INHALATION) DAILY
Qty: 180 EACH | Refills: 3 | Status: SHIPPED | OUTPATIENT
Start: 2025-02-24

## 2025-02-24 NOTE — PROGRESS NOTES
Subjective:       Patient ID: Rob Mckeon is a 85 y.o. male.  The patient's last visit with me was on 3/20/2024.     Since last visit, he was hospitalized for syncope felt 2./2 dehydration. He has had a lingering cough and mucus production for lamost 2 months which started to improve significantly by 2/12. He feels about back to his baseline. His wife is concerned that he is minimally active and on questioning, he reduced his activity level 2/2 dyspnea. Has severe chronic sinus issues and has chronic productive cough . We discussed mucus clearance options and he is not currently interested in this. Discussed trying a stronger inhaler and he is willing to give this a try.    Cough  Review of Systems   Respiratory:  Positive for cough.      Objective:      Vitals:    02/24/25 1508   BP: (!) 141/79   Pulse: 66     Wt Readings from Last 3 Encounters:   02/24/25 70.9 kg (156 lb 4.9 oz)   01/29/25 69.9 kg (154 lb 1.6 oz)   01/17/25 69.5 kg (153 lb 3.5 oz)     Temp Readings from Last 3 Encounters:   07/13/22 97.6 °F (36.4 °C) (Oral)   04/18/22 97.7 °F (36.5 °C) (Oral)   01/11/22 98.7 °F (37.1 °C)     BP Readings from Last 3 Encounters:   02/24/25 (!) 141/79   01/29/25 130/70   01/17/25 (!) 150/78     Pulse Readings from Last 3 Encounters:   02/24/25 66   01/29/25 74   01/17/25 (!) 58       Physical Exam   Constitutional: He is oriented to person, place, and time. He appears well-developed and well-nourished.   HENT:   Head: Normocephalic.   Mouth/Throat: Oropharynx is clear and moist.   Cardiovascular: Normal rate and regular rhythm.   Pulmonary/Chest: Normal expansion, symmetric chest wall expansion and effort normal. He has no wheezes. He has rhonchi.   Abdominal: Soft. He exhibits no distension.   Musculoskeletal:         General: No edema. Normal range of motion.   Lymphadenopathy: No supraclavicular adenopathy is present.     He has no cervical adenopathy.   Neurological: He is alert and oriented to person, place,  and time. Gait normal.   Skin: Skin is warm and dry. No rash noted.   Psychiatric: He has a normal mood and affect. His behavior is normal. Thought content normal.   Vitals reviewed.    CBC  Lab Results   Component Value Date    WBC 7.0 09/05/2023    HGB 16.5 09/05/2023    HCT 48.8 09/05/2023    MCV 87.7 09/05/2023     06/07/2021         CMP  Sodium   Date Value Ref Range Status   09/14/2024 141 135 - 146 mmol/L Final   07/26/2024 139 136 - 145 mmol/L Final     Potassium   Date Value Ref Range Status   09/14/2024 4.5 3.6 - 5.2 mmol/L Final   07/26/2024 5.4 (H) 3.5 - 5.1 mmol/L Final     Comment:     *No Visible Hemolysis     Chloride   Date Value Ref Range Status   07/26/2024 102 95 - 110 mmol/L Final     CO2   Date Value Ref Range Status   07/26/2024 31 (H) 23 - 29 mmol/L Final     Carbon Dioxide   Date Value Ref Range Status   09/14/2024 31 24 - 32 mmol/L Final     Glucose   Date Value Ref Range Status   07/26/2024 98 70 - 110 mg/dL Final     BUN   Date Value Ref Range Status   09/14/2024 17.0 7.0 - 25.0 mg/dL Final   07/26/2024 13 8 - 23 mg/dL Final     Creatinine   Date Value Ref Range Status   09/14/2024 1.01 0.70 - 1.40 mg/dL Final   07/26/2024 0.9 0.5 - 1.4 mg/dL Final     Calcium   Date Value Ref Range Status   09/14/2024 9.3 8.4 - 10.3 mg/dL Final   07/26/2024 9.1 8.7 - 10.5 mg/dL Final     Total Protein   Date Value Ref Range Status   07/26/2024 7.0 6.0 - 8.4 g/dL Final     Albumin   Date Value Ref Range Status   09/14/2024 3.6 3.4 - 5.0 g/dL Final   07/26/2024 3.5 3.5 - 5.2 g/dL Final     Total Bilirubin   Date Value Ref Range Status   09/14/2024 1.0 <1.3 mg/dL Final   07/26/2024 0.5 0.1 - 1.0 mg/dL Final     Comment:     For infants and newborns, interpretation of results should be based  on gestational age, weight and in agreement with clinical  observations.    Premature Infant recommended reference ranges:  Up to 24 hours.............<8.0 mg/dL  Up to 48 hours............<12.0 mg/dL  3-5  "days..................<15.0 mg/dL  6-29 days.................<15.0 mg/dL       Alkaline Phosphatase   Date Value Ref Range Status   07/26/2024 145 (H) 55 - 135 U/L Final     AST   Date Value Ref Range Status   09/14/2024 25 <45 U/L Final   07/26/2024 29 10 - 40 U/L Final     ALT   Date Value Ref Range Status   09/14/2024 20 <46 U/L Final   07/26/2024 33 10 - 44 U/L Final     Anion Gap   Date Value Ref Range Status   09/14/2024 6 (L) 8 - 16 Final     Comment:     Calculation does not include K+   07/26/2024 6 (L) 8 - 16 mmol/L Final     eGFR   Date Value Ref Range Status   09/14/2024 72 (L) >=90 mL/min/1.73m2 Final     Comment:     Calculation based on the Chronic Kidney Disease Epidemiology Collaboration (CKD-EPI) equation refit without adjustment for race.   07/26/2024 >60.0 >60 mL/min/1.73 m^2 Final       ABG  pH   Date Value Ref Range Status   09/13/2024 5.5 5.0 - 8.0 Final           Personal Diagnostic Review  I have personally reviewed the following data and added my own interpretation as below:  CT Chest 2/4/25 images personally reviewed and shows upper lobe emphysema, bilateral mucus plugging and associated infiltrates      2/24/2025     3:08 PM 1/29/2025     1:25 PM 1/17/2025     8:52 AM 7/23/2024     1:57 PM 3/26/2024    10:48 AM 3/20/2024     1:57 PM 2/1/2024     9:36 AM   Pulmonary Function Tests   SpO2 92 % 90 % 93 % 90 %  97 % 93 %   Height 5' 7" (1.702 m) 5' 7" (1.702 m)  5' 7" (1.702 m) 5' 7.01" (1.702 m) 5' 7" (1.702 m) 5' 7" (1.702 m)   Weight 70.9 kg (156 lb 4.9 oz) 69.9 kg (154 lb 1.6 oz) 69.5 kg (153 lb 3.5 oz) 69 kg (152 lb 1.9 oz) 69 kg (152 lb 1.9 oz) 70.6 kg (155 lb 10.3 oz) 71.4 kg (157 lb 6.5 oz)   BMI (Calculated) 24.5 24.1  23.8 23.8 24.4 24.6         Assessment:       1. Interstitial pulmonary disease, unspecified    2. Pulmonary emphysema, unspecified emphysema type        Encounter Medications[1]  1. Interstitial pulmonary disease, unspecified  - Ambulatory referral/consult to " Pulmonology    2. Pulmonary emphysema, unspecified emphysema type  - Ambulatory referral/consult to Pulmonology  - Spirometry with/without bronchodilator; Future    Plan:     Problem List Items Addressed This Visit          Pulmonary    Interstitial pulmonary disease, unspecified    Current Assessment & Plan   CT reviewed. Mostly mucus plugging and associated infiltrates. Does not appear consistent with fibrotic process. Suspect chronic aspiration of nasal drainage based on history.         Pulmonary emphysema    Current Assessment & Plan   Significant chronic condition to be followed longitudinally with following long term treatment plan.    Quite significant upper lobe emphysema. Change to Trelegy for triple coverage.  -PFTs with next visit.         Relevant Orders    Spirometry with/without bronchodilator       Please note Overview Notes are historic documentation. Please review A/P for current updates.  No follow-ups on file.    Future Appointments   Date Time Provider Department Center   5/26/2025 11:00 AM PULMONARY LAB [facility] [place]   5/26/2025 11:40 AM Brandon Kim MD [facility] [place]   7/30/2025  2:00 PM cR Cabrera MD [facility] [place]         Brandon Kim MD             [1]  Outpatient Encounter Medications as of 2/24/2025   Medication Sig Dispense Refill    aspirin (ECOTRIN) 81 MG EC tablet Take 81 mg by mouth.      atorvastatin (LIPITOR) 20 MG tablet TAKE 1 TABLET EVERY DAY 90 tablet 2    cetirizine (ZYRTEC) 10 MG tablet Take 10 mg by mouth.      cholecalciferol, vitamin D3, 10 mcg (400 unit) Cap Take by mouth.      cholecalciferol, vitamin D3, 400 unit Cap Take 400 Int'l Units by mouth.      diltiaZEM (CARDIZEM CD) 120 MG Cp24 TAKE 1 CAPSULE EVERY DAY 90 capsule 3    fluticasone propionate (FLONASE) 50 mcg/actuation nasal spray 1 spray (50 mcg total) by Each Nostril route 2 (two) times a day. 16 g 3    INV zimura 20 mg/mL injection 2 mg by Intravitreal  route every 30 days. FOR INVESTIGATIONAL USE ONLY.      vitamin D (VITAMIN D3) 1000 units Tab Take 1,000 Units by mouth once daily.      zolpidem (AMBIEN) 10 mg Tab Take 1 tablet (10 mg total) by mouth every evening. 90 tablet 0    [DISCONTINUED] fluticasone-salmeterol diskus inhaler 250-50 mcg Inhale 1 puff into the lungs 2 (two) times daily. Controller 60 each 0    fluticasone-umeclidin-vilanter (TRELEGY ELLIPTA) 100-62.5-25 mcg DsDv Inhale 1 puff into the lungs once daily. 180 each 3     No facility-administered encounter medications on file as of 2/24/2025.

## 2025-02-24 NOTE — ASSESSMENT & PLAN NOTE
CT reviewed. Mostly mucus plugging and associated infiltrates. Does not appear consistent with fibrotic process. Suspect chronic aspiration of nasal drainage based on history.

## 2025-02-24 NOTE — ASSESSMENT & PLAN NOTE
Significant chronic condition to be followed longitudinally with following long term treatment plan.    Quite significant upper lobe emphysema. Change to Trelegy for triple coverage.  -PFTs with next visit.

## 2025-03-20 DIAGNOSIS — F51.01 PRIMARY INSOMNIA: ICD-10-CM

## 2025-03-20 NOTE — TELEPHONE ENCOUNTER
No care due was identified.  [facility] Embedded Care Due Messages. Reference number: [id number].   3/20/2025 10:27:36 AM CDT

## 2025-03-20 NOTE — TELEPHONE ENCOUNTER
----- Message from Emma sent at 3/20/2025 10:11 AM CDT -----  Type:  Needs Medical AdviceWho Called: pt spousePharmacy name and phone #:  [facility] [address]Would the patient rather a call back or a response via [facility]? callBest Call Back Number:  [phone] Information: asking for orders for inhaler 250-50mg for ptType:  RX Refill RequestWho Called: pt wifeRefill or New Rx:refillRX Name and Strength:inhaler 250-50mg, zolpidem (AMBIEN) 10 mg TabPreferred Pharmacy with phone number: [facility] [address]Local or Mail Order:localOrdering Provider:Toryould the patient rather a call back or a response via [facility]? callBest Call Back Number:[phone] Information:  ----- Message -----  From: Emma Valderrama  Sent: 3/20/2025  10:13 AM CDT  To: Deborah Tatum Staff    Type:  Needs Medical AdviceWho Called: pt spousePharmacy name and phone #:  [facility] - [facility] [address]Would the patient rather a call back or a response via [facility]? callBest Call Back Number:  [phone] Information: asking for orders for inhaler 250-50mg for pt

## 2025-03-21 RX ORDER — ZOLPIDEM TARTRATE 10 MG/1
10 TABLET ORAL NIGHTLY
Qty: 90 TABLET | Refills: 0 | Status: SHIPPED | OUTPATIENT
Start: 2025-03-21

## 2025-04-14 RX ORDER — FLUTICASONE PROPIONATE AND SALMETEROL 250; 50 UG/1; UG/1
1 POWDER RESPIRATORY (INHALATION) 2 TIMES DAILY
COMMUNITY
End: 2025-04-14 | Stop reason: SDUPTHER

## 2025-04-14 NOTE — TELEPHONE ENCOUNTER
No care due was identified.  [facility] Embedded Care Due Messages. Reference number: [id number].   4/14/2025 11:32:09 AM CDT

## 2025-04-14 NOTE — TELEPHONE ENCOUNTER
----- Message from Odalis sent at 4/14/2025 11:26 AM CDT -----  Contact: [facility]  Requesting an RX refill or new RX.Is this a refill or new RX: Refill 1RX name and strength (copy/paste from chart):  AdvairIs this a 30 day or 90 day RX: Pharmacy name and phone # (copy/paste from chart):  [id number] [address]  [address] [facility] [address] [facility] [address] Phone: [phone]: [phone] doctors have asked that we provide their patients with the following 2 reminders -- prescription refills can take up to 72 hours, and a friendly reminder that in the future you can use your [facility] account to request refills:

## 2025-05-09 RX ORDER — FLUTICASONE PROPIONATE AND SALMETEROL 250; 50 UG/1; UG/1
1 POWDER RESPIRATORY (INHALATION) 2 TIMES DAILY
Qty: 60 EACH | Refills: 3 | Status: SHIPPED | OUTPATIENT
Start: 2025-05-09

## 2025-05-26 ENCOUNTER — HOSPITAL ENCOUNTER (OUTPATIENT)
Dept: PULMONOLOGY | Facility: HOSPITAL | Age: 85
Discharge: HOME | End: 2025-05-26
Attending: INTERNAL MEDICINE
Payer: MEDICARE

## 2025-05-26 ENCOUNTER — OFFICE VISIT (OUTPATIENT)
Dept: PULMONOLOGY | Facility: CLINIC | Age: 85
End: 2025-05-26
Payer: MEDICARE

## 2025-05-26 VITALS
BODY MASS INDEX: 24.47 KG/M2 | OXYGEN SATURATION: 91 % | SYSTOLIC BLOOD PRESSURE: 148 MMHG | DIASTOLIC BLOOD PRESSURE: 83 MMHG | HEIGHT: 67 IN | WEIGHT: 155.88 LBS | HEART RATE: 76 BPM

## 2025-05-26 DIAGNOSIS — J84.9 INTERSTITIAL PULMONARY DISEASE, UNSPECIFIED: ICD-10-CM

## 2025-05-26 DIAGNOSIS — J43.2 CENTRILOBULAR EMPHYSEMA: ICD-10-CM

## 2025-05-26 DIAGNOSIS — J43.9 PULMONARY EMPHYSEMA, UNSPECIFIED EMPHYSEMA TYPE: ICD-10-CM

## 2025-05-26 DIAGNOSIS — J30.9 ALLERGIC RHINITIS, UNSPECIFIED SEASONALITY, UNSPECIFIED TRIGGER: Primary | ICD-10-CM

## 2025-05-26 LAB
FEF 25 75 LLN: 0.45
FEF 25 75 PRE REF: 17.3 %
FEF 25 75 REF: 2.16
FET100 CHG: 3.1 %
FEV05 LLN: 0.75
FEV05 REF: 1.88
FEV1 CHG: 1.1 %
FEV1 FVC LLN: 59
FEV1 FVC PRE REF: 48.4 %
FEV1 FVC REF: 75
FEV1 LLN: 1.52
FEV1 PRE REF: 44.5 %
FEV1 REF: 2.27
FEV1 VOL CHG: 0.01
FEV1FVCZSCORE: -3.69
FEV1ZSCORE: -2.67
FVC CHG: 3.7 %
FVC LLN: 2.17
FVC PRE REF: 91 %
FVC REF: 3.07
FVC VOL CHG: 0.1
FVCZSCORE: -0.5
PEF LLN: 3.21
PEF PRE REF: 55.4 %
PEF REF: 5.21
PHYSICIAN COMMENT: ABNORMAL
POST FEF 25 75: 0.38 L/S (ref 0.45–3.87)
POST FET 100: 9.18 SEC
POST FEV1 FVC: 35.27 % (ref 58.8–89.37)
POST FEV1: 1.02 L (ref 1.52–2.95)
POST FEV5: 0.7 L (ref 0.75–3.02)
POST FVC: 2.89 L (ref 2.17–3.98)
POST PEF: 2.55 L/S (ref 3.21–7.2)
PRE FEF 25 75: 0.37 L/S (ref 0.45–3.87)
PRE FET 100: 8.9 SEC
PRE FEV05 REF: 36.8 %
PRE FEV1 FVC: 36.19 % (ref 58.8–89.37)
PRE FEV1: 1.01 L (ref 1.52–2.95)
PRE FEV5: 0.69 L (ref 0.75–3.02)
PRE FVC: 2.79 L (ref 2.17–3.98)
PRE PEF: 2.88 L/S (ref 3.21–7.2)

## 2025-05-26 PROCEDURE — 1100F PTFALLS ASSESS-DOCD GE2>/YR: CPT | Mod: CPTII,S$GLB,, | Performed by: INTERNAL MEDICINE

## 2025-05-26 PROCEDURE — 99999 PR PBB SHADOW E&M-EST. PATIENT-LVL III: CPT | Mod: PBBFAC,,, | Performed by: INTERNAL MEDICINE

## 2025-05-26 PROCEDURE — G2211 COMPLEX E/M VISIT ADD ON: HCPCS | Mod: S$GLB,,, | Performed by: INTERNAL MEDICINE

## 2025-05-26 PROCEDURE — 1126F AMNT PAIN NOTED NONE PRSNT: CPT | Mod: CPTII,S$GLB,, | Performed by: INTERNAL MEDICINE

## 2025-05-26 PROCEDURE — 1159F MED LIST DOCD IN RCRD: CPT | Mod: CPTII,S$GLB,, | Performed by: INTERNAL MEDICINE

## 2025-05-26 PROCEDURE — 99214 OFFICE O/P EST MOD 30 MIN: CPT | Mod: S$GLB,,, | Performed by: INTERNAL MEDICINE

## 2025-05-26 PROCEDURE — 94060 EVALUATION OF WHEEZING: CPT | Performed by: INTERNAL MEDICINE

## 2025-05-26 PROCEDURE — 3288F FALL RISK ASSESSMENT DOCD: CPT | Mod: CPTII,S$GLB,, | Performed by: INTERNAL MEDICINE

## 2025-05-26 RX ORDER — IPRATROPIUM BROMIDE 21 UG/1
2 SPRAY, METERED NASAL 3 TIMES DAILY
Qty: 30 ML | Refills: 11 | Status: SHIPPED | OUTPATIENT
Start: 2025-05-26

## 2025-05-26 NOTE — ASSESSMENT & PLAN NOTE
Significant chronic condition to be followed longitudinally with following long term treatment plan.    Quite significant upper lobe emphysema.   -PFTs with severe obstruction  -felt no benefit from Trelegy inhaler  -encouraged daily exercise. No interested in pulm rehab

## 2025-05-26 NOTE — PROGRESS NOTES
Subjective:       Patient ID: Rob Mckeon is a 85 y.o. male.  The patient's last visit with me was on 2/24/2025.     Did not feel Trelegy provided any benefit. Primary complaint is sinus drainage and rhinitis when he eats. He denies that SOB is significantly limitnig him. He is not exercising regularly and we discussed goals of 30 minutes of continuous movement 5x/week.    Follow-up  History of Present Illness             Review of Systems    Objective:      Vitals:    05/26/25 1132   BP: (!) 148/83   Pulse: 76     Wt Readings from Last 3 Encounters:   05/26/25 70.7 kg (155 lb 13.8 oz)   02/24/25 70.9 kg (156 lb 4.9 oz)   01/29/25 69.9 kg (154 lb 1.6 oz)     Temp Readings from Last 3 Encounters:   07/13/22 97.6 °F (36.4 °C) (Oral)   04/18/22 97.7 °F (36.5 °C) (Oral)   01/11/22 98.7 °F (37.1 °C)     BP Readings from Last 3 Encounters:   05/26/25 (!) 148/83   02/24/25 (!) 141/79   01/29/25 130/70     Pulse Readings from Last 3 Encounters:   05/26/25 76   02/24/25 66   01/29/25 74       Physical Exam   Constitutional: He appears well-developed and well-nourished.   Pulmonary/Chest: He has decreased breath sounds. He has no wheezes.   Vitals reviewed.    CBC  Lab Results   Component Value Date    WBC 7.0 09/05/2023    HGB 16.5 09/05/2023    HCT 48.8 09/05/2023    MCV 87.7 09/05/2023     06/07/2021         CMP  Sodium   Date Value Ref Range Status   09/14/2024 141 135 - 146 mmol/L Final   07/26/2024 139 136 - 145 mmol/L Final     Potassium   Date Value Ref Range Status   09/14/2024 4.5 3.6 - 5.2 mmol/L Final   07/26/2024 5.4 (H) 3.5 - 5.1 mmol/L Final     Comment:     *No Visible Hemolysis     Chloride   Date Value Ref Range Status   07/26/2024 102 95 - 110 mmol/L Final     CO2   Date Value Ref Range Status   07/26/2024 31 (H) 23 - 29 mmol/L Final     Carbon Dioxide   Date Value Ref Range Status   09/14/2024 31 24 - 32 mmol/L Final     Glucose   Date Value Ref Range Status   07/26/2024 98 70 - 110 mg/dL Final      BUN   Date Value Ref Range Status   09/14/2024 17.0 7.0 - 25.0 mg/dL Final   07/26/2024 13 8 - 23 mg/dL Final     Creatinine   Date Value Ref Range Status   09/14/2024 1.01 0.70 - 1.40 mg/dL Final   07/26/2024 0.9 0.5 - 1.4 mg/dL Final     Calcium   Date Value Ref Range Status   09/14/2024 9.3 8.4 - 10.3 mg/dL Final   07/26/2024 9.1 8.7 - 10.5 mg/dL Final     Total Protein   Date Value Ref Range Status   07/26/2024 7.0 6.0 - 8.4 g/dL Final     Albumin   Date Value Ref Range Status   09/14/2024 3.6 3.4 - 5.0 g/dL Final   07/26/2024 3.5 3.5 - 5.2 g/dL Final     Total Bilirubin   Date Value Ref Range Status   09/14/2024 1.0 <1.3 mg/dL Final   07/26/2024 0.5 0.1 - 1.0 mg/dL Final     Comment:     For infants and newborns, interpretation of results should be based  on gestational age, weight and in agreement with clinical  observations.    Premature Infant recommended reference ranges:  Up to 24 hours.............<8.0 mg/dL  Up to 48 hours............<12.0 mg/dL  3-5 days..................<15.0 mg/dL  6-29 days.................<15.0 mg/dL       Alkaline Phosphatase   Date Value Ref Range Status   07/26/2024 145 (H) 55 - 135 U/L Final     AST   Date Value Ref Range Status   09/14/2024 25 <45 U/L Final   07/26/2024 29 10 - 40 U/L Final     ALT   Date Value Ref Range Status   09/14/2024 20 <46 U/L Final   07/26/2024 33 10 - 44 U/L Final     Anion Gap   Date Value Ref Range Status   09/14/2024 6 (L) 8 - 16 Final     Comment:     Calculation does not include K+   07/26/2024 6 (L) 8 - 16 mmol/L Final     eGFR   Date Value Ref Range Status   09/14/2024 72 (L) >=90 mL/min/1.73m2 Final     Comment:     Calculation based on the Chronic Kidney Disease Epidemiology Collaboration (CKD-EPI) equation refit without adjustment for race.   07/26/2024 >60.0 >60 mL/min/1.73 m^2 Final       ABG  pH   Date Value Ref Range Status   09/13/2024 5.5 5.0 - 8.0 Final           Personal Diagnostic Review  I have personally reviewed the following  "data and added my own interpretation as below:  Estuardo shows severe obstruction      5/26/2025    11:32 AM 2/24/2025     3:08 PM 1/29/2025     1:25 PM 1/17/2025     8:52 AM 7/23/2024     1:57 PM 3/26/2024    10:48 AM 3/20/2024     1:57 PM   Pulmonary Function Tests   SpO2 91 % 92 % 90 % 93 % 90 %  97 %   Height 5' 7" (1.702 m) 5' 7" (1.702 m) 5' 7" (1.702 m)  5' 7" (1.702 m) 5' 7.01" (1.702 m) 5' 7" (1.702 m)   Weight 70.7 kg (155 lb 13.8 oz) 70.9 kg (156 lb 4.9 oz) 69.9 kg (154 lb 1.6 oz) 69.5 kg (153 lb 3.5 oz) 69 kg (152 lb 1.9 oz) 69 kg (152 lb 1.9 oz) 70.6 kg (155 lb 10.3 oz)   BMI (Calculated) 24.4 24.5 24.1  23.8 23.8 24.4         Assessment:       1. Allergic rhinitis, unspecified seasonality, unspecified trigger    2. Centrilobular emphysema    3. Interstitial pulmonary disease, unspecified        Encounter Medications[1]  1. Allergic rhinitis, unspecified seasonality, unspecified trigger    2. Centrilobular emphysema    3. Interstitial pulmonary disease, unspecified    Plan:     Problem List Items Addressed This Visit          ENT    Allergic rhinitis - Primary    Current Assessment & Plan   Ipratropium nasal before meals.            Pulmonary    Interstitial pulmonary disease, unspecified    Current Assessment & Plan   CT reviewed. Mostly mucus plugging and associated infiltrates. Does not appear consistent with fibrotic process. Suspect chronic aspiration of nasal drainage based on history.         Pulmonary emphysema    Current Assessment & Plan   Significant chronic condition to be followed longitudinally with following long term treatment plan.    Quite significant upper lobe emphysema.   -PFTs with severe obstruction  -felt no benefit from Trelegy inhaler  -encouraged daily exercise. No interested in pulm rehab            Assessment & Plan               Please note Overview Notes are historic documentation. Please review A/P for current updates.  Follow up in about 6 months (around " 11/26/2025).    Future Appointments   Date Time Provider Department Center   7/30/2025  2:00 PM Rc Cabrera MD [facility]             Brandon Kim MD             [1]  Outpatient Encounter Medications as of 5/26/2025   Medication Sig Dispense Refill    atorvastatin (LIPITOR) 20 MG tablet TAKE 1 TABLET EVERY DAY 90 tablet 2    cetirizine (ZYRTEC) 10 MG tablet Take 10 mg by mouth.      cholecalciferol, vitamin D3, 10 mcg (400 unit) Cap Take by mouth.      cholecalciferol, vitamin D3, 400 unit Cap Take 400 Int'l Units by mouth.      diltiaZEM (CARDIZEM CD) 120 MG Cp24 TAKE 1 CAPSULE EVERY DAY 90 capsule 3    fluticasone propionate (FLONASE) 50 mcg/actuation nasal spray 1 spray (50 mcg total) by Each Nostril route 2 (two) times a day. 16 g 3    fluticasone-salmeterol diskus inhaler 250-50 mcg Inhale 1 puff into the lungs 2 (two) times daily. Controller 60 each 3    zolpidem (AMBIEN) 10 mg Tab Take 1 tablet (10 mg total) by mouth every evening. 90 tablet 0    apixaban (ELIQUIS) 5 mg Tab Take 5 mg by mouth. (Patient not taking: Reported on 5/26/2025)      aspirin (ECOTRIN) 81 MG EC tablet Take 81 mg by mouth. (Patient not taking: Reported on 5/26/2025)      INV zimura 20 mg/mL injection 2 mg by Intravitreal route every 30 days. FOR INVESTIGATIONAL USE ONLY.      ipratropium (ATROVENT) 21 mcg (0.03 %) nasal spray 2 sprays by Each Nostril route 3 (three) times daily. 30 mL 11    vitamin D (VITAMIN D3) 1000 units Tab Take 1,000 Units by mouth once daily. (Patient not taking: Reported on 5/26/2025)      [DISCONTINUED] fluticasone-salmeterol diskus inhaler 250-50 mcg Inhale 1 puff into the lungs 2 (two) times daily. Controller      [DISCONTINUED] fluticasone-umeclidin-vilanter (TRELEGY ELLIPTA) 100-62.5-25 mcg DsDv Inhale 1 puff into the lungs once daily. (Patient not taking: Reported on 5/26/2025) 180 each 3     No facility-administered encounter medications on file as of 5/26/2025.

## 2025-06-09 DIAGNOSIS — I10 ESSENTIAL HYPERTENSION: Primary | ICD-10-CM

## 2025-06-09 RX ORDER — DILTIAZEM HYDROCHLORIDE 120 MG/1
120 CAPSULE, COATED, EXTENDED RELEASE ORAL
Qty: 90 CAPSULE | Refills: 3 | Status: SHIPPED | OUTPATIENT
Start: 2025-06-09

## 2025-06-09 NOTE — TELEPHONE ENCOUNTER
Care Due:                  Date            Visit Type   Department     Provider  --------------------------------------------------------------------------------                                EP -                              PRIMARY      [facility] PR[facility]  Last Visit: 01-      CARE ([facility])   CARE           Rc Dawson                              EP -                              PRIMARY      [facility] PRIMARY  Next Visit: 07-      CARE ([facility])   CARE           Rc Dawson                                                            Last  Test          Frequency    Reason                     Performed    Due Date  --------------------------------------------------------------------------------    CMP.........  12 months..  atorvastatin.............  07- 07-    Lipid Panel.  12 months..  atorvastatin.............  07- 07-    [facility] Embedded Care Due Messages. Reference number: [id number].   6/09/2025 12:52:20 AM CDT

## 2025-06-09 NOTE — TELEPHONE ENCOUNTER
Refill Routing Note   Medication(s) are not appropriate for processing by [facility] for the following reason(s):        Required vitals abnormal    [facility] action(s):  Defer     Requires labs : Yes             Appointments  past 12m or future 3m with PCP    Date Provider   Last Visit   1/29/2025 Rc Cabrera MD   Next Visit   7/30/2025 Rc Cabrera MD   ED visits in past 90 days: 0        Note composed:12:14 PM 06/09/2025

## 2025-07-24 DIAGNOSIS — E78.2 HYPERLIPIDEMIA, MIXED: ICD-10-CM

## 2025-07-24 RX ORDER — ATORVASTATIN CALCIUM 20 MG/1
20 TABLET, FILM COATED ORAL
Qty: 90 TABLET | Refills: 3 | Status: SHIPPED | OUTPATIENT
Start: 2025-07-24

## 2025-07-24 NOTE — TELEPHONE ENCOUNTER
Refill Routing Note   Medication(s) are not appropriate for processing by [facility] for the following reason(s):        Required labs outdated    [facility] action(s):  Defer             Appointments  past 12m or future 3m with PCP    Date Provider   Last Visit   1/29/2025 Rc Cabrera MD   Next Visit   7/30/2025 Rc Cabrera MD   ED visits in past 90 days: 0        Note composed:11:50 AM 07/24/2025

## 2025-07-24 NOTE — TELEPHONE ENCOUNTER
No care due was identified.  [facility] Embedded Care Due Messages. Reference number: [id number].   7/24/2025 1:34:48 AM CDT

## 2025-07-30 ENCOUNTER — OFFICE VISIT (OUTPATIENT)
Dept: PRIMARY CARE CLINIC | Facility: CLINIC | Age: 85
End: 2025-07-30
Payer: MEDICARE

## 2025-07-30 VITALS
SYSTOLIC BLOOD PRESSURE: 130 MMHG | DIASTOLIC BLOOD PRESSURE: 70 MMHG | HEIGHT: 67 IN | BODY MASS INDEX: 24.22 KG/M2 | OXYGEN SATURATION: 91 % | HEART RATE: 50 BPM | WEIGHT: 154.31 LBS

## 2025-07-30 DIAGNOSIS — J30.9 ALLERGIC RHINITIS, UNSPECIFIED SEASONALITY, UNSPECIFIED TRIGGER: ICD-10-CM

## 2025-07-30 DIAGNOSIS — J84.9 INTERSTITIAL PULMONARY DISEASE, UNSPECIFIED: ICD-10-CM

## 2025-07-30 DIAGNOSIS — I48.0 PAROXYSMAL ATRIAL FIBRILLATION: Primary | ICD-10-CM

## 2025-07-30 DIAGNOSIS — F51.01 PRIMARY INSOMNIA: ICD-10-CM

## 2025-07-30 DIAGNOSIS — R97.20 ELEVATED PSA: ICD-10-CM

## 2025-07-30 DIAGNOSIS — E78.2 HYPERLIPIDEMIA, MIXED: ICD-10-CM

## 2025-07-30 DIAGNOSIS — J43.9 PULMONARY EMPHYSEMA, UNSPECIFIED EMPHYSEMA TYPE: ICD-10-CM

## 2025-07-30 DIAGNOSIS — I10 ESSENTIAL HYPERTENSION: ICD-10-CM

## 2025-07-30 PROCEDURE — 1159F MED LIST DOCD IN RCRD: CPT | Mod: CPTII,S$GLB,, | Performed by: INTERNAL MEDICINE

## 2025-07-30 PROCEDURE — 1126F AMNT PAIN NOTED NONE PRSNT: CPT | Mod: CPTII,S$GLB,, | Performed by: INTERNAL MEDICINE

## 2025-07-30 PROCEDURE — 99214 OFFICE O/P EST MOD 30 MIN: CPT | Mod: S$GLB,,, | Performed by: INTERNAL MEDICINE

## 2025-07-30 PROCEDURE — 99999 PR PBB SHADOW E&M-EST. PATIENT-LVL IV: CPT | Mod: PBBFAC,,, | Performed by: INTERNAL MEDICINE

## 2025-07-30 NOTE — PROGRESS NOTES
"[facility] Note    Chief Complaint      Chief Complaint   Patient presents with    Follow-up       History of Present Illness      History of Present Illness    CHIEF COMPLAINT:  Mr. Mckeon presents today for follow-up    RESPIRATORY:  He reports significant improvement in cough and phlegm production. Cough primarily occurs at nighttime when climbing into bed, described as sudden onset similar to "a faucet turning on". He occasionally coughs during meals. Previously, cough was severe enough that he reported difficulty breathing. Currently, symptoms are less intense and more manageable.    RECENT HOSPITALIZATION:  He reports a recent hospital episode where he collapsed and was subsequently diagnosed with dehydration. He specifically notes that he was not experiencing atrial fibrillation as initially suspected. He appears to have fully recovered from the episode.    GENITOURINARY:  He reports increased nighttime urinary frequency, making more trips to the bathroom during the night and feeling a need to urinate. He associates these symptoms with potential prostate enlargement.    SLEEP:  He reports sleeping well with Ambien, currently taking half a tablet nightly, which he prefers over a full tablet to minimize potential side effects. He denies any sleep disturbances or difficulties falling asleep.    MEDICATIONS:  He previously took baby aspirin, which he discontinued. He was on Eliquis until May. He currently takes Ambien for sleep, using half a tablet nightly. He previously took Protonix but has since stopped the medication. His healthcare provider confirmed Eliquis as the recommended anticoagulant, noting it is the safest option with minimal required monitoring compared to alternative medications.      ROS:  Respiratory: +cough, +productive cough, +waking at night coughing, +difficulty breathing  Male Genitourinary: +excessive urination       A fib: Sees Dr. Cavazos.  On Eliquis for " anticoagulation. Diltiazem for rate control.  Denies chest pain, shortness of breath, palpitations.  HTN: BP at goal on diltiazem.  HLD: Controlled on lipitor.  LDL 46.  Insomnia: Controlled on ambien.  No new complaints.  BPH, Elevated PSA: PSA 6.8 last check.  No new or worsening obstructive symptoms.    Allergic rhinitis: Controlled with mucinex as needed and zyrtec daily.   Flu shot UTD.  Prevnar, pneumovax UTD.  COVID vaccine UTD.  Shingrix vaccine UTD.  Cscope Dr. Toscano, unsure when, will get record. Hx of polyp, 5 yr interval.     Assessment/Plan     Rob Mckeon is a 85 y.o.male with:    Assessment & Plan    PLAN SUMMARY:  - Continue Ambien at current half-tablet dosage  - Continue diltiazem for blood pressure control  - Continue atorvastatin for hyperlipidemia management  - Continue Eliquis for anticoagulant therapy  - Continue allergy medication and nasal spray as prescribed  - Maintain adequate hydration levels  - Get flu vaccines in October or November  - Referral to Dr. Luna, new cardiologist, for cardiovascular care follow-up    ATRIAL FIBRILLATION:  - Continue diltiazem to control BP and prevent atrial fibrillation.  - Discussed the need for anticoagulant therapy (Eliquis) to prevent complications.  - Mr. Mckeon's lungs sound clear today with no congestion or crackles, suggesting good management.    HYPERTENSION:  - Continue diltiazem for blood pressure control.  - Current management appears effective.    HYPERLIPIDEMIA:  - Continue atorvastatin for plaque prevention and management of hyperlipidemia.    CHRONIC COUGH:  - Mr. Mckeon reports improvement with phlegm and cough, especially at nighttime before bed.  - Lungs auscultated clear today with no congestion or crackles.  - Advised to keep allergies controlled to manage cough symptoms.    DEHYDRATION:  - Mr. Mckeon was previously diagnosed with dehydration during hospital visit.  - Instructed to maintain adequate hydration levels to prevent  recurrence.    BENIGN PROSTATIC HYPERPLASIA WITH LOWER URINARY TRACT SYMPTOMS:  - Mr. Mckeon reports increased nocturnal urination, indicating lower urinary tract symptoms associated with BPH.  - Provided management strategies for symptoms.    NOCTURIA:  - Counseled on management strategies for increased nocturnal urination.    INSOMNIA:  - Continue Ambien at current half-tablet dosage.  - Mr. Mckeon reports adequate sleep with this regimen.    ALLERGIC RHINITIS:  - Continue allergy medication and nasal spray as prescribed.  - Lungs sound clear today with no congestion or crackles, indicating good control of symptoms.    LONG-TERM ANTICOAGULANT USE:  - Continue Eliquis as prescribed for long-term anticoagulant therapy.  - Discussed benefits compared to Coumadin.    SEDATIVE USE AND FALL RISK:  - Mr. Mckeon reports adequate sleep with half a tablet of Ambien.  - Discussed increased risk of falls associated with use, especially at night.  - Recommend continuing half-tablet dosage to minimize fall risk.    FOLLOW-UP AND REFERRALS:  - Referred to Dr. Luna, a new cardiologist, for cardiovascular care with follow up.  - Recommend getting flu vaccines in October or November.         1. Paroxysmal atrial fibrillation  - Ambulatory referral/consult to Cardiology; Future    2. Essential hypertension  - Ambulatory referral/consult to Cardiology; Future    3. Hyperlipidemia, mixed  - Comprehensive Metabolic Panel; Future  - Lipid Panel; Future    4. Primary insomnia    5. Elevated PSA    6. Allergic rhinitis, unspecified seasonality, unspecified trigger    7. Pulmonary emphysema, unspecified emphysema type    8. Interstitial pulmonary disease, unspecified      Chronic conditions status updated as per HPI.  Other than changes above, cont current medications and maintain follow up with specialists.  Follow up in about 6 months (around 1/30/2026) for Follow up visit.    Future Appointments   Date Time Provider Department  [place]   8/5/2025 10:20 AM Haylie Luna MD [facility] [place]   1/26/2026  9:45 AM Rc Cabrera MD [facility] [place]             Past Medical History:  Past Medical History:   Diagnosis Date    Pericarditis        Past Surgical History:   has a past surgical history that includes Cholecystectomy and Hernia repair.    Family History:  family history includes No Known Problems in his father and mother.     Social History:  Social History[1]    Medications:  Encounter Medications[2]    Allergies:  Review of patient's allergies indicates:  No Known Allergies    Health Maintenance:  Immunization History   Administered Date(s) Administered    COVID-19, MRNA, LN-S, PF (Pfizer) (Gray Cap) 07/18/2022    COVID-19, MRNA, LN-S, PF (Pfizer) (Purple Cap) 02/10/2021, 03/03/2021, 10/04/2021    Influenza 11/14/2005, 09/16/2009, 09/28/2010, 09/26/2017, 09/14/2018    Influenza (FLUAD) - Quadrivalent - Adjuvanted - PF *Preferred* (65+) 10/02/2020, 11/08/2022, 11/08/2023    Influenza - Quadrivalent - High Dose - PF (65 years and older) 11/09/2021    Influenza - Quadrivalent - PF *Preferred* (6 months and older) 11/14/2005, 09/16/2009, 09/28/2010    Influenza - Trivalent - Afluria, Fluzone MDV 11/14/2005, 09/16/2009, 09/28/2010    Influenza - Trivalent - Fluad - Adjuvanted - PF (65 years and older 09/26/2017, 09/14/2018, 10/08/2019    Influenza - Trivalent - Fluzone High Dose - PF (65 years and older) 11/15/2024    Pneumococcal Conjugate - 13 Valent 05/30/2015    Pneumococcal Polysaccharide - 23 Valent 09/14/2018    Zoster 10/02/2014    Zoster Recombinant 11/07/2018, 03/11/2019      Health Maintenance   Topic Date Due    TETANUS VACCINE  Never done    RSV Vaccine (Age 60+ and Pregnant patients) (1 - 1-dose 75+ series) Never done    COVID-19 Vaccine (5 - 2024-25 season) 09/01/2024    Influenza Vaccine (1) 09/01/2025    Lipid Panel  07/26/2029    Shingles Vaccine  Completed    Pneumococcal Vaccines (Age 50+)  Completed     "    Physical Exam      Vital Signs  Pulse: (!) 50  SpO2: (!) 91 %  BP: 130/70  BP Location: Left arm  Patient Position: Sitting  Pain Score: 0-No pain  Height and Weight  Height: 5' 7" (170.2 cm)  Weight: 70 kg (154 lb 5.2 oz)  BSA (Calculated - sq m): 1.82 sq meters  BMI (Calculated): 24.2  Weight in (lb) to have BMI = 25: 159.3]    Physical Exam    General: No acute distress. Well-developed. Well-nourished.  Eyes: EOMI. Sclerae anicteric.  HENT: Normocephalic. Atraumatic. Nares patent. Moist oral mucosa.  Ears: Bilateral TMs clear. Bilateral EACs clear.  Cardiovascular: Regular rate. Regular rhythm. No murmurs. No rubs. No gallops. Normal S1, S2.  Respiratory: Normal respiratory effort. Clear to auscultation bilaterally. No rales. No rhonchi. No wheezing.  Abdomen: Soft. Non-tender. Non-distended. Normoactive bowel sounds.  Musculoskeletal: No  obvious deformity.  Extremities: No lower extremity edema.  Neurological: Alert & oriented x3. No slurred speech. Normal gait.  Psychiatric: Normal mood. Normal affect. Good insight. Good judgment.  Skin: Warm. Dry. No rash.         Physical Exam  Vitals reviewed.   Constitutional:       Appearance: He is well-developed.   HENT:      Head: Normocephalic and atraumatic.      Right Ear: External ear normal.      Left Ear: External ear normal.   Cardiovascular:      Rate and Rhythm: Normal rate and regular rhythm.      Heart sounds: Normal heart sounds. No murmur heard.  Pulmonary:      Effort: Pulmonary effort is normal.      Breath sounds: Normal breath sounds. No wheezing or rales.   Abdominal:      General: Bowel sounds are normal.      Palpations: Abdomen is soft.         Laboratory:    Results              CBC:  Recent Labs   Lab 09/05/23  1035   WBC 7.0   Hemoglobin 16.5   Hematocrit 48.8   MCV 87.7   MCH 29.6   MCHC 33.7     CMP:  Recent Labs   Lab 07/26/24  1209 09/13/24  1202 09/14/24  0517   Glucose 98  --   --    Calcium 9.1 9.2 9.3   Albumin 3.5 3.7 3.6   Total " Protein 7.0  --   --    Sodium 139 137 141   Potassium 5.4 H 4.0 4.5   CO2 31 H  --   --    Carbon Dioxide  --  27 31   Chloride 102  --   --    BUN 13 19.0 17.0   Alkaline Phosphatase 145 H  --   --    ALT 33 21 20   AST 29 27 25   Total Bilirubin 0.5 1.4 H 1.0     URINALYSIS:  Recent Labs   Lab 09/13/24  1528   Color, UA Yellow   Leukocytes, UA 1+ A   Urobilinogen, UA 1.0      LIPIDS:  Recent Labs   Lab 01/18/24  0835 03/26/24  1203 07/24/24  0935 07/26/24  1209   TSH  --  1.367  --   --    HDL 47  47  --  38 L 39 L   Cholesterol 121  121  --  100 L 99 L   Triglycerides 82  82  --  62 114   LDL Cholesterol 57.6 L  57.6 L  --  49.6 L 37.2 L   HDL/Cholesterol Ratio 38.8  38.8  --  38.0 39.4   Non-HDL Cholesterol 74  74  --  62 60   Total Cholesterol/HDL Ratio 2.6  2.6  --  2.6 2.5     TSH:  Recent Labs   Lab 03/26/24  1203   TSH 1.367     A1C:            This note was generated with the assistance of ambient listening technology. Verbal consent was obtained by the patient and accompanying visitor(s) for the recording of patient appointment to facilitate this note. I attest to having reviewed and edited the generated note for accuracy, though some syntax or spelling errors may persist. Please contact the author of this note for any clarification.      Rc Cabrera MD  [facility]                       [1]   Social History  Tobacco Use    Smoking status: Former    Smokeless tobacco: Never   Substance Use Topics    Alcohol use: Not Currently   [2]   Outpatient Encounter Medications as of 7/30/2025   Medication Sig Dispense Refill    apixaban (ELIQUIS) 5 mg Tab Take 5 mg by mouth.      atorvastatin (LIPITOR) 20 MG tablet TAKE 1 TABLET EVERY DAY 90 tablet 3    cetirizine (ZYRTEC) 10 MG tablet Take 10 mg by mouth.      cholecalciferol, vitamin D3, 400 unit Cap Take 400 Int'l Units by mouth.      diltiaZEM (CARDIZEM CD) 120 MG Cp24 TAKE 1 CAPSULE EVERY DAY 90 capsule 3    ipratropium (ATROVENT) 21 mcg  (0.03 %) nasal spray 2 sprays by Each Nostril route 3 (three) times daily. 30 mL 11    zolpidem (AMBIEN) 10 mg Tab Take 1 tablet (10 mg total) by mouth every evening. 90 tablet 0    [DISCONTINUED] aspirin (ECOTRIN) 81 MG EC tablet Take 81 mg by mouth. (Patient not taking: Reported on 5/26/2025)      [DISCONTINUED] atorvastatin (LIPITOR) 20 MG tablet TAKE 1 TABLET EVERY DAY 90 tablet 2    [DISCONTINUED] cholecalciferol, vitamin D3, 10 mcg (400 unit) Cap Take by mouth. (Patient not taking: Reported on 7/30/2025)      [DISCONTINUED] fluticasone propionate (FLONASE) 50 mcg/actuation nasal spray 1 spray (50 mcg total) by Each Nostril route 2 (two) times a day. (Patient not taking: Reported on 7/30/2025) 16 g 3    [DISCONTINUED] fluticasone-salmeterol diskus inhaler 250-50 mcg Inhale 1 puff into the lungs 2 (two) times daily. Controller (Patient not taking: Reported on 7/30/2025) 60 each 3    [DISCONTINUED] INV zimura 20 mg/mL injection 2 mg by Intravitreal route every 30 days. FOR INVESTIGATIONAL USE ONLY. (Patient not taking: Reported on 7/30/2025)      [DISCONTINUED] vitamin D (VITAMIN D3) 1000 units Tab Take 1,000 Units by mouth once daily. (Patient not taking: Reported on 5/26/2025)       No facility-administered encounter medications on file as of 7/30/2025.

## 2025-07-31 ENCOUNTER — LAB VISIT (OUTPATIENT)
Dept: LAB | Facility: HOSPITAL | Age: 85
End: 2025-07-31
Attending: INTERNAL MEDICINE
Payer: MEDICARE

## 2025-07-31 ENCOUNTER — TELEPHONE (OUTPATIENT)
Dept: PRIMARY CARE CLINIC | Facility: CLINIC | Age: 85
End: 2025-07-31
Payer: MEDICARE

## 2025-07-31 DIAGNOSIS — Z12.5 SCREENING FOR MALIGNANT NEOPLASM OF PROSTATE: Primary | ICD-10-CM

## 2025-07-31 DIAGNOSIS — Z12.5 SCREENING FOR MALIGNANT NEOPLASM OF PROSTATE: ICD-10-CM

## 2025-07-31 DIAGNOSIS — E78.2 HYPERLIPIDEMIA, MIXED: ICD-10-CM

## 2025-07-31 LAB
ALBUMIN SERPL BCP-MCNC: 4 G/DL (ref 3.5–5.2)
ALP SERPL-CCNC: 126 UNIT/L (ref 40–150)
ALT SERPL W/O P-5'-P-CCNC: 34 UNIT/L (ref 0–55)
ANION GAP (OHS): 7 MMOL/L (ref 8–16)
AST SERPL-CCNC: 36 UNIT/L (ref 0–50)
BILIRUB SERPL-MCNC: 0.8 MG/DL (ref 0.1–1)
BUN SERPL-MCNC: 18 MG/DL (ref 8–23)
CALCIUM SERPL-MCNC: 9.5 MG/DL (ref 8.7–10.5)
CHLORIDE SERPL-SCNC: 106 MMOL/L (ref 95–110)
CHOLEST SERPL-MCNC: 101 MG/DL (ref 120–199)
CHOLEST/HDLC SERPL: 2.3 {RATIO} (ref 2–5)
CO2 SERPL-SCNC: 28 MMOL/L (ref 23–29)
CREAT SERPL-MCNC: 0.9 MG/DL (ref 0.5–1.4)
GFR SERPLBLD CREATININE-BSD FMLA CKD-EPI: >60 ML/MIN/1.73/M2
GLUCOSE SERPL-MCNC: 90 MG/DL (ref 70–110)
HDLC SERPL-MCNC: 44 MG/DL (ref 40–75)
HDLC SERPL: 43.6 % (ref 20–50)
LDLC SERPL CALC-MCNC: 44.4 MG/DL (ref 63–159)
NONHDLC SERPL-MCNC: 57 MG/DL
POTASSIUM SERPL-SCNC: 4.5 MMOL/L (ref 3.5–5.1)
PROT SERPL-MCNC: 7.1 GM/DL (ref 6–8.4)
PSA SERPL-MCNC: 5.18 NG/ML
SODIUM SERPL-SCNC: 141 MMOL/L (ref 136–145)
TRIGL SERPL-MCNC: 63 MG/DL (ref 30–150)

## 2025-07-31 PROCEDURE — 80061 LIPID PANEL: CPT | Mod: HCNC

## 2025-07-31 PROCEDURE — 84153 ASSAY OF PSA TOTAL: CPT | Mod: HCNC

## 2025-07-31 PROCEDURE — 80053 COMPREHEN METABOLIC PANEL: CPT | Mod: HCNC

## 2025-07-31 PROCEDURE — 36415 COLL VENOUS BLD VENIPUNCTURE: CPT

## 2025-08-05 ENCOUNTER — TELEPHONE (OUTPATIENT)
Dept: PRIMARY CARE CLINIC | Facility: CLINIC | Age: 85
End: 2025-08-05
Payer: MEDICARE

## 2025-08-05 ENCOUNTER — OFFICE VISIT (OUTPATIENT)
Dept: CARDIOLOGY | Facility: CLINIC | Age: 85
End: 2025-08-05
Payer: MEDICARE

## 2025-08-05 VITALS
SYSTOLIC BLOOD PRESSURE: 166 MMHG | BODY MASS INDEX: 24.31 KG/M2 | WEIGHT: 155.19 LBS | DIASTOLIC BLOOD PRESSURE: 76 MMHG | HEART RATE: 67 BPM

## 2025-08-05 DIAGNOSIS — E78.2 HYPERLIPIDEMIA, MIXED: ICD-10-CM

## 2025-08-05 DIAGNOSIS — I25.10 CORONARY ARTERY CALCIFICATION SEEN ON CT SCAN: ICD-10-CM

## 2025-08-05 DIAGNOSIS — I10 ESSENTIAL HYPERTENSION: ICD-10-CM

## 2025-08-05 DIAGNOSIS — I49.3 PVC (PREMATURE VENTRICULAR CONTRACTION): ICD-10-CM

## 2025-08-05 DIAGNOSIS — I48.0 PAROXYSMAL ATRIAL FIBRILLATION: Primary | ICD-10-CM

## 2025-08-05 DIAGNOSIS — I35.0 NONRHEUMATIC AORTIC VALVE STENOSIS: ICD-10-CM

## 2025-08-05 LAB
OHS QRS DURATION: 80 MS
OHS QTC CALCULATION: 390 MS

## 2025-08-05 PROCEDURE — 1159F MED LIST DOCD IN RCRD: CPT | Mod: CPTII,S$GLB,, | Performed by: INTERNAL MEDICINE

## 2025-08-05 PROCEDURE — 99204 OFFICE O/P NEW MOD 45 MIN: CPT | Mod: 25,S$GLB,, | Performed by: INTERNAL MEDICINE

## 2025-08-05 PROCEDURE — 1126F AMNT PAIN NOTED NONE PRSNT: CPT | Mod: CPTII,S$GLB,, | Performed by: INTERNAL MEDICINE

## 2025-08-05 PROCEDURE — 1101F PT FALLS ASSESS-DOCD LE1/YR: CPT | Mod: CPTII,S$GLB,, | Performed by: INTERNAL MEDICINE

## 2025-08-05 PROCEDURE — 1160F RVW MEDS BY RX/DR IN RCRD: CPT | Mod: CPTII,S$GLB,, | Performed by: INTERNAL MEDICINE

## 2025-08-05 PROCEDURE — 93000 ELECTROCARDIOGRAM COMPLETE: CPT | Mod: S$GLB,,, | Performed by: INTERNAL MEDICINE

## 2025-08-05 PROCEDURE — 3288F FALL RISK ASSESSMENT DOCD: CPT | Mod: CPTII,S$GLB,, | Performed by: INTERNAL MEDICINE

## 2025-08-05 PROCEDURE — 99999 PR PBB SHADOW E&M-EST. PATIENT-LVL III: CPT | Mod: PBBFAC,,, | Performed by: INTERNAL MEDICINE

## 2025-08-05 NOTE — TELEPHONE ENCOUNTER
Labs look good.  Kidney and liver function normal.  Cholesterol panel doing well.  PSA improved from last check

## 2025-08-05 NOTE — TELEPHONE ENCOUNTER
----- Message from Candido Wilder sent at 8/5/2025 10:15 AM CDT -----    ----- Message -----  From: Darby Serrano  Sent: 8/5/2025  10:10 AM CDT  To: Deborah Tatum Staff    Please call pt regarding lab results

## 2025-08-05 NOTE — PROGRESS NOTES
Patient ID: Rob Mckeon is a 85 y.o. male.    History of Present Illness      CHIEF COMPLAINT:    - History of paroxysmal AFib, HTN, and HLD presenting for initial evaluation by a new cardiologist after transferring care from previous provider.    HPI:    Patient is an 85-year-old male with a history of paroxysmal AFib, aortic stenosis, HTN, and HLD presenting for an initial evaluation by a new cardiologist.     He had a single AFib episode 2 years ago, diagnosed at [facility], and was started on diltiazem 120 mg daily and Eliquis (apixaban) 5 mg BID for stroke prevention. He reports feeling generally well but acknowledges insufficient exercise, mentioning difficulty exercising outdoors due to heat intolerance. He quit smoking 40-45 years ago, having stopped in his early 40s.    He denies any current issues with chest pain, dyspnea, or dyspnea. He also denies any history of MI, CVA, or CHF, and denies using his inhaler.    MEDICAL HISTORY:  - Paroxysmal AFib: Diagnosed 2 years ago  - HTN  - HLD  - Emphysema  - Aortic stenosis: Moderate  - CAD (CAD)  - Carotid atherosclerosis    MEDICATIONS:  - Diltiazem 120 mg daily  - Atorvastatin 20 mg daily  - Apixaban (Eliquis) 5 mg twice daily for AFib and stroke prevention  - Discontinued aspirin 81 mg, previously for heart health    SOCIAL HISTORY:  - Smoking: Quit in early 40s, 40 years ago         Physical Exam    Vitals: Blood pressure: 166/76. Pulse: 67.  Cardiovascular: 3/6 aortic stenosis murmur.  Extremities: All normal.  Lungs: All normal.  LABS:  - CBC: 2023, normal  - CMP: 07/2025, normal  - Lipid panel: 07/2025, total cholesterol 101, HDL 44, LDL 44, triglycerides 63  - TSH: 2024, normal  TEST RESULTS  (IMAGING REVIEWED DURING  CLINIC VISIT):  - CT chest 02/2025: extensive emphysematous changes, normal size heart, coronary artery calcification  - Echo 09/2024 ([facility]): EF 60-65%, AS with peak velocity 3.3 m/s  - Carotid US 2024 ([facility] [place]): Carotid  atherosclerosis, no significant ICA disease bilaterally  - ECG 08/2025: SR, no Q wave or ST changes, PVC, 1st degree AV block  - Patch monitor 10/2024 ([facility] [place]): SR, avg HR 62 bpm, no significant ectopy or arrhythmia  - PAF episode: one-time event 2 years ago         Assessment & Plan    I48.0 Paroxysmal atrial fibrillation  I25.10 Coronary artery calcification seen on CT  I10 Essential hypertension  E78.2 Hyperlipidemia, mixed  I35.0 Nonrheumatic aortic valve stenosis  I49.3 PVC (premature ventricular contraction)    PAROXYSMAL ATRIAL FIBRILLATION:  - Assessed history of paroxysmal a-fib.  - Current medication regimen for a-fib and stroke prevention is effective.  - Current medications continued given stable condition.  - Explained the difference between aspirin (anti-platelet) and Eliquis (anticoagulant) in stroke prevention for a-fib.  - Continued Eliquis 5 mg twice daily for stroke prevention in a-fib.  - Continued Diltiazem 120 mg daily for blood pressure control and a-fib.  - Educated on the need for emergency evaluation after head trauma or falls due to anticoagulation therapy.    CORONARY ARTERY CALCIFICATION SEEN ON CT:  - Assessed history of coronary artery calcification.  - Discussed the importance of staying active to maintain cardiovascular health.  - Patient to maintain regular physical activity, such as swimming or walking, especially in cooler parts of the day.    ESSENTIAL HYPERTENSION:  - Continued Diltiazem 120 mg daily for blood pressure control and a-fib.    HYPERLIPIDEMIA, MIXED:  - Continued atorvastatin 20 mg daily for cholesterol.    NONRHEUMATIC AORTIC VALVE STENOSIS:  - Assessed history of aortic valve stenosis.  - Ordered annual echocardiogram to monitor aortic valve disease progression.    PVC (PREMATURE VENTRICULAR CONTRACTION):  - Previous rhythm monitor results showed low burden of PVCs, continuing monitoring without intervention.         This note was generated with the assistance of  ambient listening technology. Verbal consent was obtained by the patient and accompanying visitor(s) for the recording of patient appointment to facilitate this note. I attest to having reviewed and edited the generated note for accuracy, though some syntax or spelling errors may persist. Please contact the author of this note for any clarification.      Follow up in about 6 months (around 2/5/2026).

## 2025-08-28 ENCOUNTER — HOSPITAL ENCOUNTER (OUTPATIENT)
Dept: CARDIOLOGY | Facility: HOSPITAL | Age: 85
Discharge: HOME | End: 2025-08-28
Attending: INTERNAL MEDICINE
Payer: MEDICARE

## 2025-08-28 VITALS
DIASTOLIC BLOOD PRESSURE: 70 MMHG | WEIGHT: 155 LBS | BODY MASS INDEX: 24.33 KG/M2 | HEIGHT: 67 IN | SYSTOLIC BLOOD PRESSURE: 132 MMHG | HEART RATE: 55 BPM

## 2025-08-28 DIAGNOSIS — I35.0 NONRHEUMATIC AORTIC VALVE STENOSIS: ICD-10-CM

## 2025-08-28 LAB
AORTIC SIZE INDEX (SOV): 1.8 CM/M2
AORTIC SIZE INDEX: 1.9 CM/M2
ASCENDING AORTA: 3.4 CM
AV AREA BY CONTINUOUS VTI: 1.3 CM2
AV INDEX (PROSTH): 0.39
AV LVOT MEAN GRADIENT: 3 MMHG
AV LVOT PEAK GRADIENT: 4 MMHG
AV MEAN GRADIENT: 20 MMHG
AV PEAK GRADIENT: 36 MMHG
AV REGURGITATION PRESSURE HALF TIME: 882 MS
AV VALVE AREA BY VELOCITY RATIO: 1.3 CM²
AV VALVE AREA: 1.3 CM2
AV VELOCITY RATIO: 0.37
BSA FOR ECHO PROCEDURE: 1.82 M2
CV ECHO LV RWT: 0.51 CM
DOP CALC AO PEAK VEL: 3 M/S
DOP CALC AO VTI: 78.8 CM
DOP CALC LVOT AREA: 3.5 CM2
DOP CALC LVOT DIAMETER: 2.1 CM
DOP CALC LVOT PEAK VEL: 1.1 M/S
DOP CALC RVOT AREA: 4.26 CM2
DOP CALC RVOT DIAMETER: 2.33 CM
DOP CALCLVOT PEAK VEL VTI: 30.7 CM
E WAVE DECELERATION TIME: 376 MS
E/A RATIO: 0.55
E/E' RATIO: 5 M/S
ECHO EF ESTIMATED: 64 %
ECHO LV POSTERIOR WALL: 1 CM (ref 0.6–1.1)
FRACTIONAL SHORTENING: 33.3 % (ref 28–44)
HR MV ECHO: 55 BPM
INTERVENTRICULAR SEPTUM: 1.2 CM (ref 0.6–1.1)
IVC DIAMETER: 1.57 CM
LA MAJOR: 5.8 CM
LA MINOR: 4.9 CM
LA WIDTH: 3.6 CM
LEFT ATRIUM SIZE: 3.7 CM
LEFT ATRIUM VOLUME INDEX MOD: 25 ML/M2
LEFT ATRIUM VOLUME INDEX: 33 ML/M2
LEFT ATRIUM VOLUME MOD: 45 ML
LEFT ATRIUM VOLUME: 60 CM3
LEFT INTERNAL DIMENSION IN SYSTOLE: 2.6 CM (ref 2.1–4)
LEFT VENTRICLE DIASTOLIC VOLUME INDEX: 37.02 ML/M2
LEFT VENTRICLE DIASTOLIC VOLUME: 67 ML
LEFT VENTRICLE MASS INDEX: 77.4 G/M2
LEFT VENTRICLE SYSTOLIC VOLUME INDEX: 13.3 ML/M2
LEFT VENTRICLE SYSTOLIC VOLUME: 24 ML
LEFT VENTRICULAR INTERNAL DIMENSION IN DIASTOLE: 3.9 CM (ref 3.5–6)
LEFT VENTRICULAR MASS: 140.1 G
LV LATERAL E/E' RATIO: 4.1
LV SEPTAL E/E' RATIO: 6.1
Lab: 1.9 CM/M
Lab: 2 CM/M
MV A" WAVE DURATION": 167.46 MS
MV MEAN GRADIENT: 1 MMHG
MV PEAK A VEL: 0.89 M/S
MV PEAK E VEL: 0.49 M/S
MV PEAK GRADIENT: 4 MMHG
OHS CV CPX PATIENT HEIGHT IN: 67
OHS CV RV/LV RATIO: 0.85 CM
PISA TR MAX VEL: 3.2 M/S
PULM VEIN A" WAVE DURATION": 167.46 MS
PULM VEIN S/D RATIO: 1.9
PULMONIC VEIN PEAK A VELOCITY: 0.4 M/S
PV PEAK D VEL: 0.31 M/S
PV PEAK S VEL: 0.59 M/S
RA MAJOR: 5.61 CM
RA PRESSURE ESTIMATED: 8 MMHG
RA WIDTH: 4.25 CM
RIGHT ATRIAL AREA: 21.4 CM2
RIGHT VENTRICLE DIASTOLIC BASEL DIMENSION: 3.3 CM
RV TB RVSP: 11 MMHG
RV TISSUE DOPPLER FREE WALL SYSTOLIC VELOCITY 1 (APICAL 4 CHAMBER VIEW): 13.55 CM/S
SINUS: 3.2 CM
STJ: 3.1 CM
TDI LATERAL: 0.12 M/S
TDI SEPTAL: 0.08 M/S
TDI: 0.1 M/S
TRICUSPID ANNULAR PLANE SYSTOLIC EXCURSION: 1.8 CM
TV PEAK GRADIENT: 40 MMHG
TV REST PULMONARY ARTERY PRESSURE: 49 MMHG
Z-SCORE OF LEFT VENTRICULAR DIMENSION IN END DIASTOLE: -2.5
Z-SCORE OF LEFT VENTRICULAR DIMENSION IN END SYSTOLE: -1.37

## 2025-08-28 PROCEDURE — 93306 TTE W/DOPPLER COMPLETE: CPT | Mod: 26,,, | Performed by: INTERNAL MEDICINE

## 2025-08-28 PROCEDURE — 93306 TTE W/DOPPLER COMPLETE: CPT
